# Patient Record
Sex: FEMALE | Race: WHITE | NOT HISPANIC OR LATINO | Employment: FULL TIME | URBAN - NONMETROPOLITAN AREA
[De-identification: names, ages, dates, MRNs, and addresses within clinical notes are randomized per-mention and may not be internally consistent; named-entity substitution may affect disease eponyms.]

---

## 2017-01-03 ENCOUNTER — OFFICE VISIT (OUTPATIENT)
Dept: INTERNAL MEDICINE | Facility: CLINIC | Age: 42
End: 2017-01-03

## 2017-01-03 VITALS
WEIGHT: 243 LBS | HEART RATE: 79 BPM | OXYGEN SATURATION: 98 % | HEIGHT: 62 IN | BODY MASS INDEX: 44.72 KG/M2 | TEMPERATURE: 99.2 F | SYSTOLIC BLOOD PRESSURE: 126 MMHG | DIASTOLIC BLOOD PRESSURE: 90 MMHG

## 2017-01-03 DIAGNOSIS — Z12.39 BREAST CANCER SCREENING: ICD-10-CM

## 2017-01-03 DIAGNOSIS — E03.9 ACQUIRED HYPOTHYROIDISM: ICD-10-CM

## 2017-01-03 DIAGNOSIS — Z79.4 INSULIN DEPENDENT TYPE 2 DIABETES MELLITUS (HCC): Primary | ICD-10-CM

## 2017-01-03 DIAGNOSIS — F32.0 MILD SINGLE CURRENT EPISODE OF MAJOR DEPRESSIVE DISORDER (HCC): ICD-10-CM

## 2017-01-03 DIAGNOSIS — E11.9 INSULIN DEPENDENT TYPE 2 DIABETES MELLITUS (HCC): Primary | ICD-10-CM

## 2017-01-03 PROBLEM — IMO0002 INSULIN DEPENDENT TYPE 2 DIABETES MELLITUS, UNCONTROLLED: Status: ACTIVE | Noted: 2017-01-03

## 2017-01-03 LAB
ALBUMIN SERPL-MCNC: 4.2 G/DL (ref 3.2–4.8)
ALBUMIN/GLOB SERPL: 1.4 G/DL (ref 1.5–2.5)
ALP SERPL-CCNC: 73 U/L (ref 25–100)
ALT SERPL W P-5'-P-CCNC: 38 U/L (ref 7–40)
ANION GAP SERPL CALCULATED.3IONS-SCNC: 7 MMOL/L (ref 3–11)
ARTICHOKE IGE QN: 86 MG/DL (ref 0–130)
AST SERPL-CCNC: 25 U/L (ref 0–33)
BILIRUB SERPL-MCNC: 0.4 MG/DL (ref 0.3–1.2)
BUN BLD-MCNC: 9 MG/DL (ref 9–23)
BUN/CREAT SERPL: 12.9 (ref 7–25)
CALCIUM SPEC-SCNC: 9 MG/DL (ref 8.7–10.4)
CHLORIDE SERPL-SCNC: 100 MMOL/L (ref 99–109)
CHOLEST SERPL-MCNC: 168 MG/DL (ref 0–200)
CO2 SERPL-SCNC: 29 MMOL/L (ref 20–31)
CREAT BLD-MCNC: 0.7 MG/DL (ref 0.6–1.3)
GFR SERPL CREATININE-BSD FRML MDRD: 91 ML/MIN/1.73
GLOBULIN UR ELPH-MCNC: 3 GM/DL
GLUCOSE BLD-MCNC: 217 MG/DL (ref 70–100)
HBA1C MFR BLD: 10.6 %
HDLC SERPL-MCNC: 58 MG/DL (ref 40–60)
POC CREATININE URINE: 300
POC MICROALBUMIN URINE: 150
POTASSIUM BLD-SCNC: 4.2 MMOL/L (ref 3.5–5.5)
PROT SERPL-MCNC: 7.2 G/DL (ref 5.7–8.2)
SODIUM BLD-SCNC: 136 MMOL/L (ref 132–146)
TRIGL SERPL-MCNC: 153 MG/DL (ref 0–150)
TSH SERPL DL<=0.05 MIU/L-ACNC: 15.62 MIU/ML (ref 0.35–5.35)

## 2017-01-03 PROCEDURE — 82044 UR ALBUMIN SEMIQUANTITATIVE: CPT | Performed by: FAMILY MEDICINE

## 2017-01-03 PROCEDURE — 84443 ASSAY THYROID STIM HORMONE: CPT | Performed by: FAMILY MEDICINE

## 2017-01-03 PROCEDURE — 80053 COMPREHEN METABOLIC PANEL: CPT | Performed by: FAMILY MEDICINE

## 2017-01-03 PROCEDURE — 99204 OFFICE O/P NEW MOD 45 MIN: CPT | Performed by: FAMILY MEDICINE

## 2017-01-03 PROCEDURE — 80061 LIPID PANEL: CPT | Performed by: FAMILY MEDICINE

## 2017-01-03 PROCEDURE — 83036 HEMOGLOBIN GLYCOSYLATED A1C: CPT | Performed by: FAMILY MEDICINE

## 2017-01-03 RX ORDER — DICYCLOMINE HYDROCHLORIDE 10 MG/1
10 CAPSULE ORAL AS NEEDED
COMMUNITY
End: 2019-02-08

## 2017-01-03 RX ORDER — LEVOTHYROXINE SODIUM 0.03 MG/1
25 TABLET ORAL DAILY
Qty: 30 TABLET | Refills: 5 | Status: SHIPPED | OUTPATIENT
Start: 2017-01-03 | End: 2017-10-27 | Stop reason: SDUPTHER

## 2017-01-03 RX ORDER — METFORMIN HYDROCHLORIDE EXTENDED-RELEASE TABLETS 1000 MG/1
TABLET, FILM COATED, EXTENDED RELEASE ORAL
Refills: 2 | COMMUNITY
Start: 2016-10-13 | End: 2017-01-03

## 2017-01-03 RX ORDER — INSULIN GLARGINE 100 [IU]/ML
INJECTION, SOLUTION SUBCUTANEOUS
COMMUNITY
Start: 2016-12-29 | End: 2017-01-03 | Stop reason: SDUPTHER

## 2017-01-03 RX ORDER — METFORMIN HYDROCHLORIDE EXTENDED-RELEASE TABLETS 1000 MG/1
1000 TABLET, FILM COATED, EXTENDED RELEASE ORAL 2 TIMES DAILY WITH MEALS
Qty: 60 TABLET | Refills: 5 | Status: SHIPPED | OUTPATIENT
Start: 2017-01-03 | End: 2017-12-07 | Stop reason: SINTOL

## 2017-01-03 RX ORDER — HYDROXYZINE HYDROCHLORIDE 10 MG/1
10 TABLET, FILM COATED ORAL 3 TIMES DAILY PRN
COMMUNITY
End: 2019-02-08 | Stop reason: SDUPTHER

## 2017-01-03 RX ORDER — LANSOPRAZOLE 15 MG/1
15 CAPSULE, DELAYED RELEASE ORAL DAILY
COMMUNITY
End: 2022-01-25

## 2017-01-03 RX ORDER — MELATONIN
1000 DAILY
COMMUNITY

## 2017-01-03 RX ORDER — CITALOPRAM 20 MG/1
20 TABLET ORAL DAILY
COMMUNITY
End: 2017-01-17 | Stop reason: SDUPTHER

## 2017-01-03 RX ORDER — LEVOTHYROXINE SODIUM 0.2 MG/1
TABLET ORAL
COMMUNITY
Start: 2016-12-11 | End: 2017-01-25 | Stop reason: SDUPTHER

## 2017-01-03 NOTE — MR AVS SNAPSHOT
Rox Garner   1/3/2017 9:15 AM   Office Visit    Provider:  Fior Ramirez MD   Department:  [facility] PRIMARY CARE   Dept Phone:  [phone]                Your Full Care Plan              Today's Medication Changes          These changes are accurate as of: 1/3/17 10:08 AM.  If you have any questions, ask your nurse or doctor.               New Medication(s)Ordered:     linagliptin 5 MG tablet tablet   Commonly known as:  TRADJENTA   Take 1 tablet by mouth Daily.   Started by:  Fior Ramirez MD       metFORMIN 1000 MG (OSM) 24 hr tablet   Commonly known as:  FORTAMET   Take 1 tablet by mouth 2 (Two) Times a Day With Meals.   Started by:  Fior Ramirez MD         Medication(s)that have changed:     Insulin Glargine 100 UNIT/ML injection pen   Commonly known as:  LANTUS SOLOSTAR   Inject 24 Units under the skin Every Night.   What changed:    - how much to take  - how to take this  - when to take this   Changed by:  Fior Ramirez MD            Where to Get Your Medications      These medications were sent to [facility] [address] [place] [address] - [address] [place] [address] - [phone]  - [phone] [address] [address]     Phone:  [phone]     Insulin Glargine 100 UNIT/ML injection pen    linagliptin 5 MG tablet tablet    metFORMIN 1000 MG (OSM) 24 hr tablet                  Your Updated Medication List          This list is accurate as of: 1/3/17 10:08 AM.  Always use your most recent med list.                cholecalciferol 1000 UNITS tablet   Commonly known as:  VITAMIN D3       citalopram 20 MG tablet   Commonly known as:  CeleXA       dicyclomine 10 MG capsule   Commonly known as:  BENTYL       hydrOXYzine 10 MG tablet   Commonly known as:  ATARAX       Insulin Glargine 100 UNIT/ML injection pen   Commonly known as:  LANTUS SOLOSTAR   Inject 24 Units under the skin Every Night.       lansoprazole 15 MG  capsule   Commonly known as:  PREVACID       levothyroxine 200 MCG tablet   Commonly known as:  SYNTHROID, LEVOTHROID       linagliptin 5 MG tablet tablet   Commonly known as:  TRADJENTA   Take 1 tablet by mouth Daily.       metFORMIN 1000 MG (OSM) 24 hr tablet   Commonly known as:  FORTAMET   Take 1 tablet by mouth 2 (Two) Times a Day With Meals.               We Performed the Following     POC Glycosylated Hemoglobin (Hb A1C)     POC Microalbumin       You Were Diagnosed With        Codes Comments    Insulin dependent type 2 diabetes mellitus    -  Primary ICD-10-CM: E11.9, Z79.4  ICD-9-CM: 250.00, V58.67     Acquired hypothyroidism     ICD-10-CM: E03.9  ICD-9-CM: 244.9     Breast cancer screening     ICD-10-CM: Z12.39  ICD-9-CM: V76.10     Mild single current episode of major depressive disorder     ICD-10-CM: F32.0  ICD-9-CM: 296.21       Instructions     None    Patient Instructions History      [facility] Signup     [facility] momondo allows you to send messages to your doctor, view your test results, renew your prescriptions, schedule appointments, and more. To sign up, go to [employer] and click on the Sign Up Now link in the New User? box. Enter your momondo Activation Code exactly as it appears below along with the last four digits of your Social Security Number and your Date of Birth () to complete the sign-up process. If you do not sign up before the expiration date, you must request a new code.    momondo Activation Code: [email or web address]  Expires: 2017 10:08 AM    If you have questions, you can email [email or web address] or call [phone] to talk to our momondo staff. Remember, momondo is NOT to be used for urgent needs. For medical emergencies, dial 911.               Other Info from Your Visit           Allergies     Penicillins      Sulfa Antibiotics        Reason for Visit     Establish Care ESTABLISH CARE WITH DR KHAN    Diabetes     Med Refill           Vital  "Signs     Blood Pressure Pulse Temperature Height Weight Oxygen Saturation    126/90 79 99.2 °F (37.3 °C) 62\" (157.5 cm) 243 lb (110 kg) 98%    Body Mass Index Smoking Status                44.45 kg/m2 Never Smoker          Problems and Diagnoses Noted     Acquired underactive thyroid    Insulin dependent type 2 diabetes mellitus, uncontrolled    Mild single current episode of major depressive disorder    Insulin dependent type 2 diabetes mellitus    -  Primary    Breast cancer screening          Results     POC Glycosylated Hemoglobin (Hb A1C)      Component Value Standard Range & Units    Hemoglobin A1C 10.6 %                  "

## 2017-01-03 NOTE — PROGRESS NOTES
"Subjective   Rox Garner is a 41 y.o. female.     Diabetes   She presents for her initial diabetic visit. She has type 2 diabetes mellitus. Her disease course has been stable. Pertinent negatives for diabetes include no chest pain. There are no hypoglycemic complications. There are no diabetic complications. Risk factors for coronary artery disease include obesity and sedentary lifestyle. Current diabetic treatment includes insulin injections and oral agent (monotherapy). Blood glucose monitoring compliance is poor. An ACE inhibitor/angiotensin II receptor blocker is not being taken. She does not see a podiatrist.Eye exam is not current (Patient is awaiting her vision insurance card to schedule.).   Hypothyroidism   This is a chronic problem. The current episode started more than 1 year ago. The problem occurs constantly. The problem has been unchanged. Pertinent negatives include no chest pain. Treatments tried: She is taking levothyroxine.   Depression   Visit Type: initial  Onset of symptoms: at an unknown time  Progression since onset: controlled  Patient presents with the following symptoms: anhedonia (Patient has had a lot of stress in life over last few months and has just \"let myself go.\").  Aggravated by: family issues  Risk factors: major life event  Patient has a history of: depression  Treatment tried: SSRI             PHQ-9 Depression Screening 1/3/2017   Little interest or pleasure in doing things 0   Feeling down, depressed, or hopeless 1   Trouble falling or staying asleep, or sleeping too much 0   Feeling tired or having little energy 0   Poor appetite or overeating 0   Feeling bad about yourself - or that you are a failure or have let yourself or your family down 0   Trouble concentrating on things, such as reading the newspaper or watching television 0   Moving or speaking so slowly that other people could have noticed. Or the opposite - being so fidgety or restless that you have been " moving around a lot more than usual 0   Thoughts that you would be better off dead, or of hurting yourself in some way 0   PHQ-9 Total Score 1   If you checked off any problems, how difficult have these problems made it for you to do your work, take care of things at home, or get along with other people? Not difficult at all       The following portions of the patient's history were reviewed and updated as appropriate: allergies, current medications, past family history, past medical history, past social history, past surgical history and problem list.    Review of Systems   Cardiovascular: Negative for chest pain.        Leg cramps   Genitourinary: Menstrual problem: last menses 12/12/16.   All other systems reviewed and are negative.      Objective   Physical Exam   Constitutional: She is oriented to person, place, and time. Vital signs are normal. She appears well-developed and well-nourished. She is active. She does not appear ill.   Appears stated age. Well groomed. Obese     HENT:   Head: Normocephalic and atraumatic. Hair is normal.   Right Ear: Hearing normal.   Left Ear: Hearing normal.   Nose: Nose normal.   Eyes: EOM and lids are normal. Pupils are equal, round, and reactive to light.   Wearing glasses     Cardiovascular: Normal rate, regular rhythm and normal heart sounds.    Pulmonary/Chest: Effort normal and breath sounds normal.   Neurological: She is alert and oriented to person, place, and time. No cranial nerve deficit. Gait normal.   Skin: She is not diaphoretic. No cyanosis. Nails show no clubbing.   Psychiatric: She has a normal mood and affect. Her behavior is normal. Judgment and thought content normal.   Nursing note and vitals reviewed.        Lab Results   Component Value Date    HGBA1C 10.6 01/03/2017       Assessment/Plan   Rox was seen today for establish care, diabetes and med refill.    Diagnoses and all orders for this visit:    Insulin dependent type 2 diabetes mellitus  -      POC Glycosylated Hemoglobin (Hb A1C)  -     metFORMIN (FORTAMET) 1000 MG (OSM) 24 hr tablet; Take 1 tablet by mouth 2 (Two) Times a Day With Meals.  -     Comprehensive Metabolic Panel; Future  -     Lipid Panel; Future  -     POC Microalbumin  -     linagliptin (TRADJENTA) 5 MG tablet tablet; Take 1 tablet by mouth Daily.  -     Insulin Glargine (LANTUS SOLOSTAR) 100 UNIT/ML injection pen; Inject 24 Units under the skin Every Night.  -     Comprehensive Metabolic Panel  -     Lipid Panel    Acquired hypothyroidism  -     TSH; Future  -     TSH    Breast cancer screening  -     Mammo Screening Digital Tomosynthesis Bilateral With CAD; Future    Mild single current episode of major depressive disorder      Patient to work on eating habits, improved lifestyle, using medicine as directed. Added Tradjenta. Encouraged checking sugars.

## 2017-01-17 ENCOUNTER — TELEPHONE (OUTPATIENT)
Dept: INTERNAL MEDICINE | Facility: CLINIC | Age: 42
End: 2017-01-17

## 2017-01-17 RX ORDER — CITALOPRAM 20 MG/1
20 TABLET ORAL DAILY
Qty: 30 TABLET | Refills: 0 | Status: SHIPPED | OUTPATIENT
Start: 2017-01-17 | End: 2017-03-17 | Stop reason: SDUPTHER

## 2017-01-17 NOTE — TELEPHONE ENCOUNTER
----- Message from Rox Garner sent at 1/17/2017  7:54 AM EST -----  Regarding: Prescription Question  Contact: [phone]  I need a refill on my Celexa

## 2017-01-25 RX ORDER — LEVOTHYROXINE SODIUM 0.2 MG/1
200 TABLET ORAL DAILY
Qty: 30 TABLET | Refills: 3 | Status: SHIPPED | OUTPATIENT
Start: 2017-01-25 | End: 2017-11-17 | Stop reason: SDUPTHER

## 2017-01-31 ENCOUNTER — PATIENT OUTREACH (OUTPATIENT)
Dept: INTERNAL MEDICINE | Facility: CLINIC | Age: 42
End: 2017-01-31

## 2017-02-01 ENCOUNTER — OFFICE VISIT (OUTPATIENT)
Dept: INTERNAL MEDICINE | Facility: CLINIC | Age: 42
End: 2017-02-01

## 2017-02-01 VITALS
WEIGHT: 243.6 LBS | BODY MASS INDEX: 44.83 KG/M2 | HEART RATE: 57 BPM | OXYGEN SATURATION: 95 % | SYSTOLIC BLOOD PRESSURE: 138 MMHG | TEMPERATURE: 97.5 F | DIASTOLIC BLOOD PRESSURE: 88 MMHG | HEIGHT: 62 IN

## 2017-02-01 DIAGNOSIS — Z79.4 INSULIN DEPENDENT TYPE 2 DIABETES MELLITUS (HCC): ICD-10-CM

## 2017-02-01 DIAGNOSIS — E11.9 INSULIN DEPENDENT TYPE 2 DIABETES MELLITUS (HCC): ICD-10-CM

## 2017-02-01 DIAGNOSIS — IMO0002 INSULIN DEPENDENT TYPE 2 DIABETES MELLITUS, UNCONTROLLED: Primary | ICD-10-CM

## 2017-02-01 LAB — GLUCOSE BLDC GLUCOMTR-MCNC: 319 MG/DL (ref 70–130)

## 2017-02-01 PROCEDURE — 82962 GLUCOSE BLOOD TEST: CPT | Performed by: FAMILY MEDICINE

## 2017-02-01 PROCEDURE — 99213 OFFICE O/P EST LOW 20 MIN: CPT | Performed by: FAMILY MEDICINE

## 2017-02-01 NOTE — PROGRESS NOTES
Subjective   Rox Garner is a 41 y.o. female.     Diabetes   She presents for her follow-up diabetic visit. She has type 2 diabetes mellitus. Her disease course has been improving. There are no diabetic associated symptoms. Pertinent negatives for diabetes include no chest pain. There are no hypoglycemic complications. There are no diabetic complications. Pertinent negatives for diabetic complications include no retinopathy (had eye exam last week). Risk factors for coronary artery disease include diabetes mellitus, obesity and stress. Current diabetic treatment includes insulin injections and oral agent (dual therapy). She is compliant with treatment most of the time. Her weight is stable. Diabetic current diet: working to decrease fast foods, eat more oatmeal and healthy foods. When asked about meal planning, she reported none. She has not had a previous visit with a dietitian. Blood glucose monitoring compliance is inadequate. An ACE inhibitor/angiotensin II receptor blocker is not being taken. She does not see a podiatrist.Eye exam is current.    Checked fasting sugar once since last visit and was 282. Battery  on glucometer.    The following portions of the patient's history were reviewed and updated as appropriate: allergies, current medications, past family history, past medical history, past social history, past surgical history and problem list.    Review of Systems   Respiratory: Negative for shortness of breath.    Cardiovascular: Negative for chest pain.   Gastrointestinal: Negative for abdominal pain.       Objective   Physical Exam   Constitutional: She is oriented to person, place, and time. Vital signs are normal. She appears well-developed and well-nourished. She is active. She does not appear ill.   Appears stated age. Well groomed. Extremely Obese     HENT:   Head: Normocephalic and atraumatic.   Right Ear: Hearing normal.   Left Ear: Hearing normal.   Nose: Nose normal.   Eyes:  EOM and lids are normal. Pupils are equal, round, and reactive to light.   Wearing glasses     Cardiovascular: Normal rate, regular rhythm and normal heart sounds.    Pulmonary/Chest: Effort normal and breath sounds normal.   Neurological: She is alert and oriented to person, place, and time. No cranial nerve deficit. Gait normal.   Skin: She is not diaphoretic. No cyanosis. Nails show no clubbing.   Psychiatric: She has a normal mood and affect. Her behavior is normal. Judgment and thought content normal.   Nursing note and vitals reviewed.    Fasting glucose here in office was 319.    Assessment/Plan   Rox was seen today for diabetes.    Diagnoses and all orders for this visit:    Insulin dependent type 2 diabetes mellitus, uncontrolled  -     POC Glucose Fingerstick    Insulin dependent type 2 diabetes mellitus  -     Insulin Glargine (LANTUS SOLOSTAR) 100 UNIT/ML injection pen; Inject 30 Units under the skin Every Night.    Check fasting sugars and slowly increase Lantus dose. Will see back in 2-3 weeks with glucose log.

## 2017-03-17 RX ORDER — CITALOPRAM 20 MG/1
TABLET ORAL
Qty: 30 TABLET | Refills: 0 | Status: SHIPPED | OUTPATIENT
Start: 2017-03-17 | End: 2017-05-17 | Stop reason: SDUPTHER

## 2017-04-27 ENCOUNTER — OFFICE VISIT (OUTPATIENT)
Dept: INTERNAL MEDICINE | Facility: CLINIC | Age: 42
End: 2017-04-27

## 2017-04-27 VITALS
SYSTOLIC BLOOD PRESSURE: 114 MMHG | BODY MASS INDEX: 44.68 KG/M2 | HEART RATE: 68 BPM | DIASTOLIC BLOOD PRESSURE: 80 MMHG | TEMPERATURE: 98.1 F | HEIGHT: 62 IN | OXYGEN SATURATION: 99 % | WEIGHT: 242.8 LBS

## 2017-04-27 DIAGNOSIS — IMO0002 INSULIN DEPENDENT TYPE 2 DIABETES MELLITUS, UNCONTROLLED: Primary | ICD-10-CM

## 2017-04-27 DIAGNOSIS — E11.9 INSULIN DEPENDENT TYPE 2 DIABETES MELLITUS (HCC): ICD-10-CM

## 2017-04-27 DIAGNOSIS — Z79.4 INSULIN DEPENDENT TYPE 2 DIABETES MELLITUS (HCC): ICD-10-CM

## 2017-04-27 LAB
GLUCOSE BLDC GLUCOMTR-MCNC: 346 MG/DL (ref 70–130)
HBA1C MFR BLD: 11.4 %

## 2017-04-27 PROCEDURE — 82962 GLUCOSE BLOOD TEST: CPT | Performed by: FAMILY MEDICINE

## 2017-04-27 PROCEDURE — 99213 OFFICE O/P EST LOW 20 MIN: CPT | Performed by: FAMILY MEDICINE

## 2017-04-27 PROCEDURE — 83036 HEMOGLOBIN GLYCOSYLATED A1C: CPT | Performed by: FAMILY MEDICINE

## 2017-04-29 NOTE — PROGRESS NOTES
Subjective   Rox Garner is a 41 y.o. female.     Diabetes   She presents for her follow-up diabetic visit. She has type 2 diabetes mellitus. Her disease course has been improving. Associated symptoms include fatigue. Pertinent negatives for diabetes include no chest pain, no foot ulcerations and no visual change. There are no hypoglycemic complications. Symptoms are stable. There are no diabetic complications. Pertinent negatives for diabetic complications include no retinopathy (had eye exam January). Risk factors for coronary artery disease include diabetes mellitus, obesity and stress. Current diabetic treatment includes insulin injections and oral agent (dual therapy). She is compliant with treatment most of the time. Her weight is stable. She is following a generally unhealthy (working to decrease fast foods, eat more oatmeal and healthy foods) diet. When asked about meal planning, she reported none. She has not had a previous visit with a dietitian. Blood glucose monitoring compliance is inadequate. Home blood sugar record trend: Has been consistently > 200 in AM. Was 340s this morning but did not take lantus last night. An ACE inhibitor/angiotensin II receptor blocker is not being taken. She does not see a podiatrist.Eye exam is current.        The following portions of the patient's history were reviewed and updated as appropriate: allergies, current medications, past family history, past medical history, past social history, past surgical history and problem list.    Review of Systems   Constitutional: Positive for fatigue.   Respiratory: Negative for shortness of breath.    Cardiovascular: Negative for chest pain.   Gastrointestinal: Negative for abdominal pain.   Genitourinary: Positive for menstrual problem (skips months, negative pregnancy tests).       Vitals:    04/27/17 0913   BP: 114/80   Pulse: 68   Temp: 98.1 °F (36.7 °C)   SpO2: 99%       Objective   Physical Exam   Constitutional:  She is oriented to person, place, and time. Vital signs are normal. She appears well-developed and well-nourished. She is active. She does not have a sickly appearance. She does not appear ill.   Appears stated age. Well groomed. Extremely Obese     HENT:   Head: Normocephalic and atraumatic. Hair is normal.   Right Ear: Hearing normal.   Left Ear: Hearing normal.   Nose: Nose normal.   Eyes: EOM and lids are normal. Pupils are equal, round, and reactive to light.   Wearing glasses     Cardiovascular: Normal rate, regular rhythm and normal heart sounds.    Pulmonary/Chest: Effort normal and breath sounds normal.   Musculoskeletal: She exhibits no deformity.   Neurological: She is alert and oriented to person, place, and time. No cranial nerve deficit. Gait normal.   Skin: She is not diaphoretic. No cyanosis. Nails show no clubbing.   Psychiatric: She has a normal mood and affect. Her behavior is normal. Judgment and thought content normal.   Nursing note and vitals reviewed.    Lab Results   Component Value Date    HGBA1C 11.4 04/27/2017       Assessment/Plan   Rox was seen today for diabetes.    Diagnoses and all orders for this visit:    Insulin dependent type 2 diabetes mellitus, uncontrolled  -     POC Glycosylated Hemoglobin (Hb A1C)  -     Liraglutide 18 MG/3ML solution pen-injector; 0.6 mg QDAY for 1 week; then 1.2 mg once daily; may increase further to 1.8 mg once daily  -     Insulin Glargine (TOUJEO SOLOSTAR) 300 UNIT/ML solution pen-injector; Inject 30 Units under the skin Daily.  -     Insulin Glargine (TOUJEO SOLOSTAR) 300 UNIT/ML solution pen-injector; Inject 30 Units under the skin every night at bedtime for 30 days.  -     Liraglutide 18 MG/3ML solution pen-injector; Inject 1.8 mg under the skin Daily.  -     linagliptin (TRADJENTA) 5 MG tablet tablet; Take 1 tablet by mouth Daily.    Insulin dependent type 2 diabetes mellitus  -     POCT Glucose    Encouraged healthy diet, exercise. Keep log of  sugars and bring to visit.   Has not yet had mammogram, encouraged her to schedule it.  Plan to do pap smear next visit.           Fior Ramirez MD

## 2017-05-19 RX ORDER — CITALOPRAM 20 MG/1
TABLET ORAL
Qty: 30 TABLET | Refills: 5 | Status: SHIPPED | OUTPATIENT
Start: 2017-05-19 | End: 2018-04-18 | Stop reason: SDUPTHER

## 2017-10-27 ENCOUNTER — TELEPHONE (OUTPATIENT)
Dept: INTERNAL MEDICINE | Facility: CLINIC | Age: 42
End: 2017-10-27

## 2017-10-27 RX ORDER — LEVOTHYROXINE SODIUM 0.03 MG/1
25 TABLET ORAL DAILY
Qty: 30 TABLET | Refills: 5 | Status: SHIPPED | OUTPATIENT
Start: 2017-10-27 | End: 2018-10-05

## 2017-10-27 NOTE — TELEPHONE ENCOUNTER
----- Message from Rox Garner sent at 10/27/2017 10:41 AM EDT -----  Regarding: Prescription Question  Contact: [phone]  I need refill on levothyroxine 200

## 2017-11-17 RX ORDER — LEVOTHYROXINE SODIUM 0.2 MG/1
200 TABLET ORAL DAILY
Qty: 30 TABLET | Refills: 0 | Status: SHIPPED | OUTPATIENT
Start: 2017-11-17 | End: 2017-12-07 | Stop reason: SDUPTHER

## 2017-12-07 ENCOUNTER — OFFICE VISIT (OUTPATIENT)
Dept: INTERNAL MEDICINE | Facility: CLINIC | Age: 42
End: 2017-12-07

## 2017-12-07 VITALS
TEMPERATURE: 98.1 F | RESPIRATION RATE: 12 BRPM | DIASTOLIC BLOOD PRESSURE: 80 MMHG | WEIGHT: 243 LBS | SYSTOLIC BLOOD PRESSURE: 122 MMHG | HEIGHT: 62 IN | HEART RATE: 83 BPM | OXYGEN SATURATION: 97 % | BODY MASS INDEX: 44.72 KG/M2

## 2017-12-07 DIAGNOSIS — E03.9 ACQUIRED HYPOTHYROIDISM: ICD-10-CM

## 2017-12-07 DIAGNOSIS — E66.01 MORBID OBESITY (HCC): ICD-10-CM

## 2017-12-07 DIAGNOSIS — R53.83 FATIGUE, UNSPECIFIED TYPE: ICD-10-CM

## 2017-12-07 DIAGNOSIS — IMO0002 INSULIN DEPENDENT TYPE 2 DIABETES MELLITUS, UNCONTROLLED: Primary | ICD-10-CM

## 2017-12-07 DIAGNOSIS — Z28.21 INFLUENZA VACCINATION DECLINED: ICD-10-CM

## 2017-12-07 DIAGNOSIS — E55.9 VITAMIN D DEFICIENCY: ICD-10-CM

## 2017-12-07 LAB
25(OH)D3+25(OH)D2 SERPL-MCNC: 19.9 NG/ML
ALBUMIN SERPL-MCNC: 4 G/DL (ref 3.5–5)
ALBUMIN/GLOB SERPL: 1.3 G/DL (ref 1–2)
ALP SERPL-CCNC: 81 U/L (ref 38–126)
ALT SERPL-CCNC: 59 U/L (ref 13–69)
AST SERPL-CCNC: 43 U/L (ref 15–46)
BASOPHILS # BLD AUTO: 0.08 10*3/MM3 (ref 0–0.2)
BASOPHILS NFR BLD AUTO: 1.3 % (ref 0–2.5)
BILIRUB SERPL-MCNC: 0.4 MG/DL (ref 0.2–1.3)
BUN SERPL-MCNC: 10 MG/DL (ref 7–20)
BUN/CREAT SERPL: 16.7 (ref 7.1–23.5)
CALCIUM SERPL-MCNC: 9.4 MG/DL (ref 8.4–10.2)
CHLORIDE SERPL-SCNC: 102 MMOL/L (ref 98–107)
CHOLEST SERPL-MCNC: 142 MG/DL (ref 0–199)
CO2 SERPL-SCNC: 24 MMOL/L (ref 26–30)
CREAT SERPL-MCNC: 0.6 MG/DL (ref 0.6–1.3)
EOSINOPHIL # BLD AUTO: 0.34 10*3/MM3 (ref 0–0.7)
EOSINOPHIL NFR BLD AUTO: 5.6 % (ref 0–7)
ERYTHROCYTE [DISTWIDTH] IN BLOOD BY AUTOMATED COUNT: 13.8 % (ref 11.5–14.5)
FOLATE SERPL-MCNC: 11.7 NG/ML
GFR SERPLBLD CREATININE-BSD FMLA CKD-EPI: 109 ML/MIN/1.73
GFR SERPLBLD CREATININE-BSD FMLA CKD-EPI: 132 ML/MIN/1.73
GLOBULIN SER CALC-MCNC: 3.2 GM/DL
GLUCOSE BLDC GLUCOMTR-MCNC: 267 MG/DL (ref 70–130)
GLUCOSE SERPL-MCNC: 250 MG/DL (ref 74–98)
HBA1C MFR BLD: 11 %
HCT VFR BLD AUTO: 43.3 % (ref 37–47)
HDLC SERPL-MCNC: 47 MG/DL (ref 40–60)
HGB BLD-MCNC: 13.7 G/DL (ref 12–16)
IMM GRANULOCYTES # BLD: 0.01 10*3/MM3 (ref 0–0.06)
IMM GRANULOCYTES NFR BLD: 0.2 % (ref 0–0.6)
LDLC SERPL CALC-MCNC: 75 MG/DL (ref 0–99)
LYMPHOCYTES # BLD AUTO: 1.94 10*3/MM3 (ref 0.6–3.4)
LYMPHOCYTES NFR BLD AUTO: 32 % (ref 10–50)
MCH RBC QN AUTO: 26.9 PG (ref 27–31)
MCHC RBC AUTO-ENTMCNC: 31.6 G/DL (ref 30–37)
MCV RBC AUTO: 84.9 FL (ref 81–99)
MONOCYTES # BLD AUTO: 0.43 10*3/MM3 (ref 0–0.9)
MONOCYTES NFR BLD AUTO: 7.1 % (ref 0–12)
NEUTROPHILS # BLD AUTO: 3.26 10*3/MM3 (ref 2–6.9)
NEUTROPHILS NFR BLD AUTO: 53.8 % (ref 37–80)
NRBC BLD AUTO-RTO: 0 /100 WBC (ref 0–0)
PLATELET # BLD AUTO: 103 10*3/MM3 (ref 130–400)
POTASSIUM SERPL-SCNC: 4.8 MMOL/L (ref 3.5–5.1)
PROT SERPL-MCNC: 7.2 G/DL (ref 6.3–8.2)
RBC # BLD AUTO: 5.1 10*6/MM3 (ref 4.2–5.4)
SODIUM SERPL-SCNC: 137 MMOL/L (ref 137–145)
TRIGL SERPL-MCNC: 99 MG/DL
TSH SERPL DL<=0.005 MIU/L-ACNC: 7.25 MIU/ML (ref 0.47–4.68)
VIT B12 SERPL-MCNC: 534 PG/ML (ref 239–931)
VLDLC SERPL CALC-MCNC: 19.8 MG/DL
WBC # BLD AUTO: 6.06 10*3/MM3 (ref 4.8–10.8)

## 2017-12-07 PROCEDURE — 99214 OFFICE O/P EST MOD 30 MIN: CPT | Performed by: FAMILY MEDICINE

## 2017-12-07 PROCEDURE — 82962 GLUCOSE BLOOD TEST: CPT | Performed by: FAMILY MEDICINE

## 2017-12-07 PROCEDURE — 83036 HEMOGLOBIN GLYCOSYLATED A1C: CPT | Performed by: FAMILY MEDICINE

## 2017-12-07 RX ORDER — LEVOTHYROXINE SODIUM 0.2 MG/1
200 TABLET ORAL DAILY
Qty: 30 TABLET | Refills: 3 | Status: SHIPPED | OUTPATIENT
Start: 2017-12-07 | End: 2018-10-05 | Stop reason: SDUPTHER

## 2017-12-07 NOTE — PROGRESS NOTES
Subjective    Rox Garner is a 42 y.o. female here for:  Chief Complaint   Patient presents with   • Diabetes   • Hypothyroidism     HPI Comments: Extended release metformin caused excessive GI upset so she had to stop it.  Tried Victoza though was throwing up the next day.  Taking insulin at night and Tradjenta as prescribed. She is trying to cut back on food.     Diabetes   She presents for her follow-up diabetic visit. She has type 2 diabetes mellitus. No MedicAlert identification noted. The initial diagnosis of diabetes was made 11 years ago. Associated symptoms include fatigue and polydipsia. Pertinent negatives for diabetes include no chest pain. There are no hypoglycemic complications. Symptoms are worsening. Diabetic complications include PVD. Pertinent negatives for diabetic complications include no CVA or heart disease. Risk factors for coronary artery disease include obesity and sedentary lifestyle. Current diabetic treatment includes insulin injections and oral agent (monotherapy). She is compliant with treatment most of the time. She is currently taking insulin at bedtime. Insulin injections are given by patient. Rotation sites for injection include the abdominal wall. Her weight is stable. She is following a generally unhealthy diet. When asked about meal planning, she reported none. She has not had a previous visit with a dietitian. She never participates in exercise. Blood glucose monitoring compliance is inadequate. There is no change in her home blood glucose trend. (Most sugars are greater than 250, very common to be over 260 in the morning fasting.) She does not see a podiatrist.Eye exam is current.   Hypothyroidism   This is a chronic problem. The current episode started more than 1 year ago. The problem occurs daily. Associated symptoms include fatigue. Pertinent negatives include no chest pain. Treatments tried: levothyroxine. The treatment provided significant relief.        The  following portions of the patient's history were reviewed and updated as appropriate: allergies, current medications, past family history, past medical history, past social history, past surgical history and problem list.    Review of Systems   Constitutional: Positive for fatigue.   Respiratory: Negative for shortness of breath.    Cardiovascular: Negative for chest pain.   Endocrine: Positive for polydipsia.       Vitals:    12/07/17 0914   BP: 122/80   Pulse: 83   Resp: 12   Temp: 98.1 °F (36.7 °C)   SpO2: 97%         Objective   Physical Exam   Constitutional: She is oriented to person, place, and time. Vital signs are normal. She appears well-developed and well-nourished. She is active. She does not have a sickly appearance. She does not appear ill.   Appears stated age. Well groomed. Extremely Obese   HENT:   Head: Normocephalic and atraumatic. Hair is normal.   Right Ear: Hearing normal.   Left Ear: Hearing normal.   Nose: Nose normal.   Eyes: EOM and lids are normal. Pupils are equal, round, and reactive to light. No scleral icterus.   Wearing glasses   Neck: Phonation normal.   Cardiovascular: Normal rate, regular rhythm and normal heart sounds.    Pulmonary/Chest: Effort normal and breath sounds normal.   Musculoskeletal: She exhibits no deformity.   Neurological: She is alert and oriented to person, place, and time. No cranial nerve deficit. Gait normal.   Skin: She is not diaphoretic. No cyanosis. Nails show no clubbing.   Psychiatric: She has a normal mood and affect. Her behavior is normal. Judgment and thought content normal.   Nursing note and vitals reviewed.      Lab Results   Component Value Date    HGBA1C 11.4 04/27/2017       Lab Results   Component Value Date    TSH 15.618 (H) 01/03/2017     Office Visit on 12/07/2017   Component Date Value Ref Range Status   • Hemoglobin A1C 12/07/2017 11.0  % Final   • Glucose 12/07/2017 267* 70 - 130 mg/dL Final           Rox was seen today for diabetes  and hypothyroidism.    Diagnoses and all orders for this visit:    Insulin dependent type 2 diabetes mellitus, uncontrolled  -     POC Glycosylated Hemoglobin (Hb A1C)  -     Dulaglutide (TRULICITY) 0.75 MG/0.5ML solution pen-injector; Inject 0.75 mg under the skin 1 (One) Time Per Week.  -     Dulaglutide (TRULICITY) 0.75 MG/0.5ML solution pen-injector; Inject 0.75 mg under the skin 1 (One) Time Per Week.  -     Lipid Panel  -     CBC & Differential  -     Comprehensive Metabolic Panel  -     POC Glucose Fingerstick  -     Insulin Glargine (TOUJEO SOLOSTAR) 300 UNIT/ML solution pen-injector; Inject 70 Units under the skin Daily for 30 days.    Acquired hypothyroidism  -     levothyroxine (SYNTHROID, LEVOTHROID) 200 MCG tablet; Take 1 tablet by mouth Daily.  -     TSH  -     CBC & Differential  -     Comprehensive Metabolic Panel    Morbid obesity  -     Vitamin B12 & Folate    Fatigue, unspecified type  -     Vitamin B12 & Folate    Vitamin D deficiency  -     Vitamin D 25 Hydroxy    Influenza vaccination declined        · Encouraged improved dietary habits, exercise, weight loss.  Past labs showed decreased vitamin D level sore rechecking that today.  · Sample of Trulicity provided as well as a co-pay card.  Prescription sent as well.  · I encouraged her to get mammogram done as well as returning for Pap smear.      Fior Ramirez MD

## 2017-12-10 RX ORDER — ERGOCALCIFEROL 1.25 MG/1
50000 CAPSULE ORAL WEEKLY
Qty: 6 CAPSULE | Refills: 0 | Status: SHIPPED | OUTPATIENT
Start: 2017-12-10 | End: 2018-10-05

## 2018-03-29 ENCOUNTER — TRANSCRIBE ORDERS (OUTPATIENT)
Dept: ADMINISTRATIVE | Facility: HOSPITAL | Age: 43
End: 2018-03-29

## 2018-03-29 DIAGNOSIS — Z12.31 VISIT FOR SCREENING MAMMOGRAM: Primary | ICD-10-CM

## 2018-04-18 RX ORDER — CITALOPRAM 20 MG/1
TABLET ORAL
Qty: 30 TABLET | Refills: 5 | Status: SHIPPED | OUTPATIENT
Start: 2018-04-18 | End: 2018-10-05 | Stop reason: SDUPTHER

## 2018-06-13 DIAGNOSIS — IMO0002 INSULIN DEPENDENT TYPE 2 DIABETES MELLITUS, UNCONTROLLED: ICD-10-CM

## 2018-06-13 RX ORDER — LINAGLIPTIN 5 MG/1
TABLET, FILM COATED ORAL
Qty: 30 TABLET | Refills: 5 | Status: SHIPPED | OUTPATIENT
Start: 2018-06-13 | End: 2018-10-05

## 2018-10-05 ENCOUNTER — OFFICE VISIT (OUTPATIENT)
Dept: INTERNAL MEDICINE | Facility: CLINIC | Age: 43
End: 2018-10-05

## 2018-10-05 VITALS
DIASTOLIC BLOOD PRESSURE: 75 MMHG | WEIGHT: 235.13 LBS | TEMPERATURE: 98 F | HEIGHT: 62 IN | HEART RATE: 75 BPM | BODY MASS INDEX: 43.27 KG/M2 | SYSTOLIC BLOOD PRESSURE: 120 MMHG | OXYGEN SATURATION: 98 %

## 2018-10-05 DIAGNOSIS — Z23 NEED FOR HEPATITIS A VACCINATION: ICD-10-CM

## 2018-10-05 DIAGNOSIS — IMO0002 INSULIN DEPENDENT TYPE 2 DIABETES MELLITUS, UNCONTROLLED: Primary | ICD-10-CM

## 2018-10-05 DIAGNOSIS — E03.9 ACQUIRED HYPOTHYROIDISM: ICD-10-CM

## 2018-10-05 DIAGNOSIS — E66.01 MORBID OBESITY (HCC): ICD-10-CM

## 2018-10-05 LAB
ALBUMIN SERPL-MCNC: 4.1 G/DL (ref 3.5–5)
ALBUMIN/GLOB SERPL: 1.2 G/DL (ref 1–2)
ALP SERPL-CCNC: 88 U/L (ref 38–126)
ALT SERPL-CCNC: 69 U/L (ref 13–69)
AST SERPL-CCNC: 68 U/L (ref 15–46)
BASOPHILS # BLD AUTO: 0.06 10*3/MM3 (ref 0–0.2)
BASOPHILS NFR BLD AUTO: 0.8 % (ref 0–2.5)
BILIRUB SERPL-MCNC: 0.5 MG/DL (ref 0.2–1.3)
BUN SERPL-MCNC: 7 MG/DL (ref 7–20)
BUN/CREAT SERPL: 11.7 (ref 7.1–23.5)
CALCIUM SERPL-MCNC: 9.4 MG/DL (ref 8.4–10.2)
CHLORIDE SERPL-SCNC: 102 MMOL/L (ref 98–107)
CO2 SERPL-SCNC: 25 MMOL/L (ref 26–30)
CREAT SERPL-MCNC: 0.6 MG/DL (ref 0.6–1.3)
EOSINOPHIL # BLD AUTO: 0.34 10*3/MM3 (ref 0–0.7)
EOSINOPHIL NFR BLD AUTO: 4.6 % (ref 0–7)
ERYTHROCYTE [DISTWIDTH] IN BLOOD BY AUTOMATED COUNT: 13.8 % (ref 11.5–14.5)
GLOBULIN SER CALC-MCNC: 3.3 GM/DL
GLUCOSE SERPL-MCNC: 339 MG/DL (ref 74–98)
HBA1C MFR BLD: 10.8 %
HCT VFR BLD AUTO: 42.6 % (ref 37–47)
HGB BLD-MCNC: 13.6 G/DL (ref 12–16)
IMM GRANULOCYTES # BLD: 0.01 10*3/MM3 (ref 0–0.06)
IMM GRANULOCYTES NFR BLD: 0.1 % (ref 0–0.6)
LYMPHOCYTES # BLD AUTO: 2.39 10*3/MM3 (ref 0.6–3.4)
LYMPHOCYTES NFR BLD AUTO: 32.5 % (ref 10–50)
MCH RBC QN AUTO: 27.8 PG (ref 27–31)
MCHC RBC AUTO-ENTMCNC: 31.9 G/DL (ref 30–37)
MCV RBC AUTO: 86.9 FL (ref 81–99)
MONOCYTES # BLD AUTO: 0.46 10*3/MM3 (ref 0–0.9)
MONOCYTES NFR BLD AUTO: 6.3 % (ref 0–12)
NEUTROPHILS # BLD AUTO: 4.09 10*3/MM3 (ref 2–6.9)
NEUTROPHILS NFR BLD AUTO: 55.7 % (ref 37–80)
NRBC BLD AUTO-RTO: 0 /100 WBC (ref 0–0)
PLATELET # BLD AUTO: 118 10*3/MM3 (ref 130–400)
POTASSIUM SERPL-SCNC: 4.3 MMOL/L (ref 3.5–5.1)
PROT SERPL-MCNC: 7.4 G/DL (ref 6.3–8.2)
RBC # BLD AUTO: 4.9 10*6/MM3 (ref 4.2–5.4)
SODIUM SERPL-SCNC: 135 MMOL/L (ref 137–145)
T4 FREE SERPL-MCNC: 0.89 NG/DL (ref 0.78–2.19)
TSH SERPL DL<=0.005 MIU/L-ACNC: 5.31 MIU/ML (ref 0.47–4.68)
WBC # BLD AUTO: 7.35 10*3/MM3 (ref 4.8–10.8)

## 2018-10-05 PROCEDURE — 90471 IMMUNIZATION ADMIN: CPT | Performed by: FAMILY MEDICINE

## 2018-10-05 PROCEDURE — 90632 HEPA VACCINE ADULT IM: CPT | Performed by: FAMILY MEDICINE

## 2018-10-05 PROCEDURE — 83036 HEMOGLOBIN GLYCOSYLATED A1C: CPT | Performed by: FAMILY MEDICINE

## 2018-10-05 PROCEDURE — 99214 OFFICE O/P EST MOD 30 MIN: CPT | Performed by: FAMILY MEDICINE

## 2018-10-05 RX ORDER — CITALOPRAM 20 MG/1
20 TABLET ORAL DAILY
Qty: 30 TABLET | Refills: 5 | Status: SHIPPED | OUTPATIENT
Start: 2018-10-05 | End: 2019-05-13 | Stop reason: SDUPTHER

## 2018-10-05 RX ORDER — CLOTRIMAZOLE 1 %
CREAM (GRAM) TOPICAL 2 TIMES DAILY
Qty: 45 G | Refills: 1 | Status: SHIPPED | OUTPATIENT
Start: 2018-10-05 | End: 2019-02-08

## 2018-10-05 RX ORDER — LEVOTHYROXINE SODIUM 0.2 MG/1
200 TABLET ORAL DAILY
Qty: 30 TABLET | Refills: 3 | Status: SHIPPED | OUTPATIENT
Start: 2018-10-05 | End: 2019-02-11 | Stop reason: SDUPTHER

## 2018-10-08 RX ORDER — LEVOTHYROXINE SODIUM 0.03 MG/1
25 TABLET ORAL DAILY
Qty: 90 TABLET | Refills: 3 | Status: SHIPPED | OUTPATIENT
Start: 2018-10-08 | End: 2019-02-11 | Stop reason: SDUPTHER

## 2018-10-11 NOTE — PROGRESS NOTES
Subjective    Rox Garner is a 42 y.o. female here for:  Chief Complaint   Patient presents with   • Diabetes     follow up for Diabetes and Medication refills.     Diabetes   She presents for her follow-up diabetic visit. She has type 2 diabetes mellitus. No MedicAlert identification noted. The initial diagnosis of diabetes was made 11 years ago. Associated symptoms include fatigue and polydipsia. Pertinent negatives for diabetes include no chest pain. There are no hypoglycemic complications. Symptoms are worsening. Pertinent negatives for diabetic complications include no CVA or heart disease. Risk factors for coronary artery disease include obesity, sedentary lifestyle, diabetes mellitus and stress. Current diabetic treatment includes insulin injections and oral agent (monotherapy). She is compliant with treatment some of the time. She is currently taking insulin at bedtime. Insulin injections are given by patient. Rotation sites for injection include the abdominal wall. Her weight is stable. She is following a generally unhealthy diet. When asked about meal planning, she reported none. She has not had a previous visit with a dietitian. She never participates in exercise. Blood glucose monitoring compliance is inadequate. She does not see a podiatrist.Eye exam is current.   Hypothyroidism   This is a chronic problem. The current episode started more than 1 year ago. The problem occurs daily. Associated symptoms include fatigue. Pertinent negatives include no chest pain. Treatments tried: levothyroxine. The treatment provided significant relief.          The following portions of the patient's history were reviewed and updated as appropriate: allergies, current medications, past family history, past medical history, past social history, past surgical history and problem list.    Review of Systems   Constitutional: Positive for fatigue. Negative for activity change.   Cardiovascular: Negative for chest  "pain.   Endocrine: Positive for polydipsia.   Psychiatric/Behavioral: Positive for stress.       Vitals:    10/05/18 1409   BP: 120/75   Pulse: 75   Temp: 98 °F (36.7 °C)   SpO2: 98%   Weight: 107 kg (235 lb 2 oz)   Height: 157.5 cm (62.01\")         Objective   Physical Exam   Constitutional: She is oriented to person, place, and time. Vital signs are normal. She appears well-developed and well-nourished. She is active.  Non-toxic appearance. She does not appear ill. No distress. She is morbidly obese.  HENT:   Head: Normocephalic and atraumatic. Hair is normal.   Right Ear: Hearing and external ear normal.   Left Ear: Hearing and external ear normal.   Nose: Nose normal.   Mouth/Throat: Mucous membranes are not dry.   Eyes: Pupils are equal, round, and reactive to light. EOM and lids are normal. No scleral icterus.   Neck: Trachea normal and phonation normal. Neck supple.   Cardiovascular: Normal rate, regular rhythm and normal heart sounds.    No murmur heard.  Pulmonary/Chest: Effort normal and breath sounds normal. No stridor.   Musculoskeletal: She exhibits no edema or deformity.   Neurological: She is alert and oriented to person, place, and time. She displays no tremor. No cranial nerve deficit. Gait normal.   Skin: Skin is warm and dry. No rash noted. She is not diaphoretic. No cyanosis. Nails show no clubbing.   Psychiatric: She has a normal mood and affect. Her speech is normal and behavior is normal. Judgment and thought content normal. Cognition and memory are normal.   Nursing note and vitals reviewed.      Lab Results   Component Value Date    HGBA1C 10.8 10/05/2018       Assessment/Plan     Problem List Items Addressed This Visit        Digestive    Morbid obesity (CMS/HCC)       Endocrine    Insulin dependent type 2 diabetes mellitus, uncontrolled (CMS/[facility]) - Primary    Overview     · Failed metformin xr due to GI side effects         Current Assessment & Plan     Diabetes is uncontrolled.   Diabetic " diet. Checking sugar levels and keeping log. Samples of Jardiance and Ozempic given. Told Tradjenta for now.  Diabetes will be reassessed in 3 months.         Relevant Medications    Insulin Glargine (TOUJEO SOLOSTAR SC)    Semaglutide (OZEMPIC) 1 MG/DOSE solution pen-injector    Empagliflozin (JARDIANCE) 10 MG tablet    Empagliflozin (JARDIANCE) 10 MG tablet    Semaglutide 0.25 or 0.5 MG/DOSE solution pen-injector    Other Relevant Orders    POC Glycosylated Hemoglobin (Hb A1C) (Completed)    Comprehensive Metabolic Panel (Completed)    Acquired hypothyroidism    Relevant Medications    levothyroxine (SYNTHROID, LEVOTHROID) 200 MCG tablet    Other Relevant Orders    TSH (Completed)    T4, Free (Completed)    CBC & Differential (Completed)      Other Visit Diagnoses     Need for hepatitis A vaccination        Relevant Medications    hepatitis A (HAVRIX) vaccine 1,440 Units    Other Relevant Orders    Hepatitis A Vaccine Adult IM (Completed)          · Patient's Body mass index is 42.99 kg/m². BMI is above normal parameters. Recommendations include: nutrition counseling and pharmacological intervention.  ·     Return in about 4 months (around 1/21/2019) for Diabetes follow up.    Fior Ramirez MD

## 2018-10-11 NOTE — ASSESSMENT & PLAN NOTE
Diabetes is uncontrolled.   Diabetic diet. Checking sugar levels and keeping log. Samples of Jardiance and Ozempic given. Told Tradjenta for now.  Diabetes will be reassessed in 3 months.

## 2018-10-22 ENCOUNTER — TELEPHONE (OUTPATIENT)
Dept: INTERNAL MEDICINE | Facility: CLINIC | Age: 43
End: 2018-10-22

## 2018-10-22 NOTE — TELEPHONE ENCOUNTER
Usman from Pharmacy benefits called regarding Ms. Garner. He just wanted to see if we received a PA for the patients wendi.      Usman can be reached at [phone]    Ref # [id number]

## 2018-10-22 NOTE — TELEPHONE ENCOUNTER
Krystal,  Pharmacy benefit department called about PA for pt that was faxed over needing notes fax# [phone] phone   [id number] reference #

## 2018-10-24 NOTE — TELEPHONE ENCOUNTER
I spoke with Ankur at [facility] [employer]. He is going to refax the paperwork that the company is request.

## 2018-10-29 ENCOUNTER — TELEPHONE (OUTPATIENT)
Dept: INTERNAL MEDICINE | Facility: CLINIC | Age: 43
End: 2018-10-29

## 2018-10-29 NOTE — TELEPHONE ENCOUNTER
Krystal from Pharmacy benefits department left a VM on refill request line wanting to follow up about pts prior auth for Ozempic. She states they also want chart notes, and to use the following reference # : [id number]

## 2018-10-30 NOTE — TELEPHONE ENCOUNTER
Krystal,  Pharmacy benefit department called about PA for pt that was faxed over needing chart notes & labs her fax# [phone] phone   [id number] reference #    I advised her that I had passed the message along and you are just waiting on Dr. Ramirez's signature.

## 2019-01-21 ENCOUNTER — PRIOR AUTHORIZATION (OUTPATIENT)
Dept: INTERNAL MEDICINE | Facility: CLINIC | Age: 44
End: 2019-01-21

## 2019-02-08 ENCOUNTER — OFFICE VISIT (OUTPATIENT)
Dept: INTERNAL MEDICINE | Facility: CLINIC | Age: 44
End: 2019-02-08

## 2019-02-08 VITALS
WEIGHT: 233.5 LBS | DIASTOLIC BLOOD PRESSURE: 88 MMHG | HEIGHT: 62 IN | OXYGEN SATURATION: 99 % | TEMPERATURE: 97.9 F | HEART RATE: 87 BPM | SYSTOLIC BLOOD PRESSURE: 134 MMHG | BODY MASS INDEX: 42.97 KG/M2 | RESPIRATION RATE: 16 BRPM

## 2019-02-08 DIAGNOSIS — IMO0002 INSULIN DEPENDENT TYPE 2 DIABETES MELLITUS, UNCONTROLLED: Primary | ICD-10-CM

## 2019-02-08 DIAGNOSIS — E66.01 MORBID OBESITY (HCC): ICD-10-CM

## 2019-02-08 DIAGNOSIS — E03.9 ACQUIRED HYPOTHYROIDISM: ICD-10-CM

## 2019-02-08 LAB
HBA1C MFR BLD: 9 %
T4 FREE SERPL-MCNC: 1.1 NG/DL (ref 0.78–2.19)
TSH SERPL DL<=0.005 MIU/L-ACNC: 3.67 MIU/ML (ref 0.47–4.68)

## 2019-02-08 PROCEDURE — 99214 OFFICE O/P EST MOD 30 MIN: CPT | Performed by: FAMILY MEDICINE

## 2019-02-08 PROCEDURE — 83036 HEMOGLOBIN GLYCOSYLATED A1C: CPT | Performed by: FAMILY MEDICINE

## 2019-02-08 RX ORDER — HYDROXYZINE HYDROCHLORIDE 10 MG/1
10 TABLET, FILM COATED ORAL 3 TIMES DAILY PRN
Qty: 270 TABLET | Refills: 4 | Status: SHIPPED | OUTPATIENT
Start: 2019-02-08 | End: 2021-04-14 | Stop reason: SDUPTHER

## 2019-02-08 NOTE — PROGRESS NOTES
Subjective    Rox Garner is a 43 y.o. female here for:  Chief Complaint   Patient presents with   • Diabetes     4 mo f/u pt states no problems or concerns    • Obesity     f/u   • Hypothyroidism     f/u        Morbid obesity: She has been able to lose some weight since last visit, her clothes are fitting much looser than previously.    She used the sample of ozempic but pharmacy did not feel that other prescription sent so she only used for about a month.      Diabetes   She presents for her follow-up diabetic visit. She has type 2 diabetes mellitus. No MedicAlert identification noted. The initial diagnosis of diabetes was made 11 years ago. Associated symptoms include fatigue and weight loss. Pertinent negatives for diabetes include no chest pain. There are no hypoglycemic complications. Pertinent negatives for diabetic complications include no CVA, heart disease or nephropathy. Risk factors for coronary artery disease include obesity, sedentary lifestyle, diabetes mellitus and stress. Current diabetic treatment includes insulin injections and oral agent (monotherapy). She is compliant with treatment most of the time. She is currently taking insulin at bedtime. Insulin injections are given by patient. Rotation sites for injection include the abdominal wall. She is following a generally unhealthy diet. When asked about meal planning, she reported none. She has not had a previous visit with a dietitian. She never participates in exercise. Blood glucose monitoring compliance is inadequate. She does not see a podiatrist.Eye exam is current.   Hypothyroidism   This is a chronic problem. The current episode started more than 1 year ago. The problem occurs daily. Associated symptoms include fatigue. Pertinent negatives include no chest pain or nausea. Treatments tried: levothyroxine. The treatment provided significant relief.          The following portions of the patient's history were reviewed and updated  "as appropriate: allergies, current medications, past family history, past medical history, past social history, past surgical history and problem list.    Review of Systems   Constitutional: Positive for fatigue and unexpected weight loss.   Cardiovascular: Negative for chest pain.   Gastrointestinal: Negative for nausea.   Psychiatric/Behavioral: Positive for stress.       Vitals:    02/08/19 1044   BP: 134/88   Pulse: 87   Resp: 16   Temp: 97.9 °F (36.6 °C)   TempSrc: Temporal   SpO2: 99%   Weight: 106 kg (233 lb 8 oz)   Height: 157.5 cm (62.01\")         Objective   Physical Exam   Constitutional: She is oriented to person, place, and time. Vital signs are normal. She appears well-developed and well-nourished. She is active.  Non-toxic appearance. She does not appear ill. No distress. She is morbidly obese.  HENT:   Head: Normocephalic and atraumatic. Hair is normal.   Right Ear: Hearing and external ear normal.   Left Ear: Hearing and external ear normal.   Nose: Nose normal.   Mouth/Throat: Mucous membranes are not dry.   Eyes: EOM and lids are normal. Pupils are equal, round, and reactive to light. No scleral icterus.   Neck: Trachea normal and phonation normal. Neck supple.   Cardiovascular: Normal rate, regular rhythm and normal heart sounds.   No murmur heard.  Pulmonary/Chest: Effort normal and breath sounds normal. No stridor.   Musculoskeletal: She exhibits no edema or deformity.   Neurological: She is alert and oriented to person, place, and time. She displays no tremor. No cranial nerve deficit. Gait normal.   Skin: Skin is warm and dry. No rash noted. She is not diaphoretic. No cyanosis. Nails show no clubbing.   Psychiatric: She has a normal mood and affect. Her speech is normal and behavior is normal. Judgment and thought content normal. Cognition and memory are normal.   Nursing note and vitals reviewed.      Lab Results   Component Value Date    HGBA1C 9.0 02/08/2019    HGBA1C 10.8 10/05/2018    " HGBA1C 11.0 12/07/2017     Lab Results   Component Value Date    TSH 5.310 (H) 10/05/2018     Lab Results   Component Value Date    FREET4 0.89 10/05/2018         Assessment/Plan     Problem List Items Addressed This Visit        Digestive    Morbid obesity (CMS/[facility])       Endocrine    Insulin dependent type 2 diabetes mellitus, uncontrolled (CMS/[facility]) - Primary    Overview     · Failed metformin xr due to GI side effects         Relevant Medications    Semaglutide 1 MG/DOSE solution pen-injector    Empagliflozin 25 MG tablet    Insulin Glargine (TOUJEO SOLOSTAR) 300 UNIT/ML solution pen-injector    Other Relevant Orders    POC Glycosylated Hemoglobin (Hb A1C) (Completed)    POC Microalbumin    Acquired hypothyroidism    Relevant Orders    TSH    T4, Free          · Patient's Body mass index is 42.7 kg/m². BMI is above normal parameters. Recommendations include: pharmacological intervention.  · Increased Jardiance, refilled ozempic, continue insulin     Return in about 3 months (around 5/15/2019) for Diabetes follow up.    Fior Ramirez MD

## 2019-02-08 NOTE — PATIENT INSTRUCTIONS
Serving Sizes  A serving size is a measured amount of food or drink, such as one slice of bread, that has an associated nutrient content. Knowing the serving size of a food or drink can help you determine how much of that food you should consume.  What is the size of one serving?  The size of one healthy serving depends on the food or drink. To determine a serving size, read the food label. If the food or drink does not have a food label, try to find serving size information online. Or, use the following to estimate the size of one adult serving:  Grain  1 slice bread. ½ bagel. ½ cup pasta.  Vegetable  ½ cup cooked or canned vegetables. 1 cup raw, leafy greens.  Fruit  ½ cup canned fruit. 1 medium fruit. ¼ cup dried fruit.  Meat and Other Protein Sources  1 oz meat, poultry, or fish. ¼ cup cooked beans. 1 egg. ¼ cup nuts or seeds. 1 Tbsp nut butter. ¼ cup tofu or tempeh. 2 Tbsp hummus.  Dairy  An individual container of yogurt (6-8 oz). 1 piece of cheese the size of your thumb (1 oz). 1 cup (8 oz) milk or milk alternative.  Fat  A piece the size of one dice. 1 tsp soft margarine. 1 Tbsp mayonnaise. 1 tsp vegetable oil. 1 Tbsp regular salad dressing. 2 Tbsp low-fat salad dressing.  How many servings should I eat from each food group each day?  The following are the suggested number of servings to try and have every day from each food group. You can also look at your eating throughout the week and aim for meeting these requirements on most days for overall healthy eating.  Grain  6-8 servings. Try to have half of your grains from whole grains, such as whole wheat bread, corn tortillas, oatmeal, brown rice, whole wheat pasta, and bulgur.  Vegetable  At least 2½-3 servings.  Fruit  2 servings.  Meat and Other Protein Foods  5-6 servings. Aim to have lean proteins, such as chicken, turkey, fish, beans, or tofu.  Dairy  3 servings. Choose low-fat or nonfat if you are trying to control your weight.  Fat  2-3 servings.  Is  a serving the same thing as a portion?  No. A portion is the actual amount you eat, which may be more than one serving. Knowing the specific serving size of a food and the nutritional information that goes with it can help you make a healthy decision on what size portion to eat.  What are some tips to help me learn healthy serving sizes?  · Check food labels for serving sizes. Many foods that come as a single portion actually contain multiple servings.  · Determine the serving size of foods you commonly eat and figure out how large a portion you usually eat.  · Measure the number of servings that can be held by the bowls, glasses, cups, and plates you typically use. For example, pour your breakfast cereal into your regular bowl and then pour it into a measuring cup.  · For 1-2 days, measure the serving sizes of all the foods you eat.  · Practice estimating serving sizes and determining how big your portions should be.      This information is not intended to replace advice given to you by your health care provider. Make sure you discuss any questions you have with your health care provider.  Document Released: 09/15/2004 Document Revised: 08/12/2017 Document Reviewed: 03/17/2015  [employer] Interactive Patient Education © 2018 [employer]    MyPlate from [employer]    The general, healthful diet is based on the 2010 Dietary Guidelines for Americans. The amount of food you need to eat from each food group depends on your age, sex, and level of physical activity and can be individualized by a dietitian. Go to ChooseMyPlate.gov for more information.  What do I need to know about the MyPlate plan?  · Enjoy your food, but eat less.  · Avoid oversized portions.  ? ½ of your plate should include fruits and vegetables.  ? ¼ of your plate should be grains.  ? ¼ of your plate should be protein.  Grains  · Make at least half of your grains whole grains.  · For a 2,000 calorie daily food plan, eat 6 oz every day.  · 1 oz is about 1  slice bread, 1 cup cereal, or ½ cup cooked rice, cereal, or pasta.  Vegetables  · Make half your plate fruits and vegetables.  · For a 2,000 calorie daily food plan, eat 2½ cups every day.  · 1 cup is about 1 cup raw or cooked vegetables or vegetable juice or 2 cups raw leafy greens.  Fruits  · Make half your plate fruits and vegetables.  · For a 2,000 calorie daily food plan, eat 2 cups every day.  · 1 cup is about 1 cup fruit or 100% fruit juice or ½ cup dried fruit.  Protein  · For a 2,000 calorie daily food plan, eat 5½ oz every day.  · 1 oz is about 1 oz meat, poultry, or fish, ¼ cup cooked beans, 1 egg, 1 Tbsp peanut butter, or ½ oz nuts or seeds.  Dairy  · Switch to fat-free or low-fat (1%) milk.  · For a 2,000 calorie daily food plan, eat 3 cups every day.  · 1 cup is about 1 cup milk or yogurt or soy milk (soy beverage), 1½ oz natural cheese, or 2 oz processed cheese.  Fats, Oils, and Empty Calories  · Only small amounts of oils are recommended.  · Empty calories are calories from solid fats or added sugars.  · Compare sodium in foods like soup, bread, and frozen meals. Choose the foods with lower numbers.  · Drink water instead of sugary drinks.  What foods can I eat?  Grains  Whole grains such as whole wheat, quinoa, millet, and bulgur. Bread, rolls, and pasta made from whole grains. Brown or wild rice. Hot or cold cereals made from whole grains and without added sugar.  Vegetables  All fresh vegetables, especially fresh red, dark green, or orange vegetables. Peas and beans. Low-sodium frozen or canned vegetables prepared without added salt. Low-sodium vegetable juices.  Fruits  All fresh, frozen, and dried fruits. Canned fruit packed in water or fruit juice without added sugar. Fruit juices without added sugar.  Meats and Other Protein Sources  Boiled, baked, or grilled lean meat trimmed of fat. Skinless poultry. Fresh seafood and shellfish. Canned seafood packed in water. Unsalted nuts and unsalted nut  butters. Tofu. Dried beans and pea. Eggs.  Dairy  Low-fat or fat-free milk, yogurt, and cheeses.  Sweets and Desserts  Frozen desserts made from low-fat milk.  Fats and Oils  Olive, peanut, and canola oils and margarine. Salad dressing and mayonnaise made from these oils.  Other  Soups and casseroles made from allowed ingredients and without added fat or salt.  The items listed above may not be a complete list of recommended foods or beverages. Contact your dietitian for more options.  What foods are not recommended?  Grains  Sweetened, low-fiber cereals. Packaged baked goods. Snack crackers and chips. Cheese crackers, butter crackers, and biscuits. Frozen waffles, sweet breads, doughnuts, pastries, packaged baking mixes, pancakes, cakes, and cookies.  Vegetables  Regular canned or frozen vegetables or vegetables prepared with salt. Canned tomatoes. Canned tomato sauce. Fried vegetables. Vegetables in cream sauce or cheese sauce.  Fruits  Fruits packed in syrup or made with added sugar.  Meats and Other Protein Sources  Marbled or fatty meats such as ribs. Poultry with skin. Fried meats, poultry, eggs, or fish. Sausages, hot dogs, and deli meats such as pastrami, bologna, or salami.  Dairy  Whole milk, cream, cheeses made from whole milk, sour cream. Ice cream or yogurt made from whole milk or with added sugar.  Beverages  For adults, no more than one alcoholic drink per day. Regular soft drinks or other sugary beverages. Juice drinks.  Sweets and Desserts  Sugary or fatty desserts, candy, and other sweets.  Fats and Oils  Solid shortening or partially hydrogenated oils. Solid margarine. Margarine that contains trans fats. Butter.    The items listed above may not be a complete list of foods and beverages to avoid. Contact your dietitian for more information.    This information is not intended to replace advice given to you by your health care provider. Make sure you discuss any questions you have with your health  care provider.  Document Released: 01/06/2009 Document Revised: 05/25/2017 Document Reviewed: 11/26/2014  [employer] Interactive Patient Education © 2018 [employer]        Calorie Counting for Weight Loss  Calories are units of energy. Your body needs a certain amount of calories from food to keep you going throughout the day. When you eat more calories than your body needs, your body stores the extra calories as fat. When you eat fewer calories than your body needs, your body burns fat to get the energy it needs.  Calorie counting means keeping track of how many calories you eat and drink each day. Calorie counting can be helpful if you need to lose weight. If you make sure to eat fewer calories than your body needs, you should lose weight. Ask your health care provider what a healthy weight is for you.  For calorie counting to work, you will need to eat the right number of calories in a day in order to lose a healthy amount of weight per week. A dietitian can help you determine how many calories you need in a day and will give you suggestions on how to reach your calorie goal.  · A healthy amount of weight to lose per week is usually 1-2 lb (0.5-0.9 kg). This usually means that your daily calorie intake should be reduced by 500-750 calories.  · Eating 1,200 - 1,500 calories per day can help most women lose weight.  · Eating 1,500 - 1,800 calories per day can help most men lose weight.     What do I need to know about calorie counting?  In order to meet your daily calorie goal, you will need to:  · Find out how many calories are in each food you would like to eat. Try to do this before you eat.  · Decide how much of the food you plan to eat.  · Write down what you ate and how many calories it had. Doing this is called keeping a food log.     To successfully lose weight, it is important to balance calorie counting with a healthy lifestyle that includes regular activity. Aim for 150 minutes of moderate exercise  (such as walking) or 75 minutes of vigorous exercise (such as running) each week.  Where do I find calorie information?       The number of calories in a food can be found on a Nutrition Facts label. If a food does not have a Nutrition Facts label, try to look up the calories online or ask your dietitian for help.  Remember that calories are listed per serving. If you choose to have more than one serving of a food, you will have to multiply the calories per serving by the amount of servings you plan to eat. For example, the label on a package of bread might say that a serving size is 1 slice and that there are 90 calories in a serving. If you eat 1 slice, you will have eaten 90 calories. If you eat 2 slices, you will have eaten 180 calories.  How do I keep a food log?  Immediately after each meal, record the following information in your food log:  · What you ate. Don't forget to include toppings, sauces, and other extras on the food.  · How much you ate. This can be measured in cups, ounces, or number of items.  · How many calories each food and drink had.  · The total number of calories in the meal.     Keep your food log near you, such as in a small notebook in your pocket, or use a mobile zhou or website. Some programs will calculate calories for you and show you how many calories you have left for the day to meet your goal.  What are some calorie counting tips?  · Use your calories on foods and drinks that will fill you up and not leave you hungry:  ? Some examples of foods that fill you up are nuts and nut butters, vegetables, lean proteins, and high-fiber foods like whole grains. High-fiber foods are foods with more than 5 g fiber per serving.  ? Drinks such as sodas, specialty coffee drinks, alcohol, and juices have a lot of calories, yet do not fill you up.  · Eat nutritious foods and avoid empty calories. Empty calories are calories you get from foods or beverages that do not have many vitamins or protein,  "such as candy, sweets, and soda. It is better to have a nutritious high-calorie food (such as an avocado) than a food with few nutrients (such as a bag of chips).  · Know how many calories are in the foods you eat most often. This will help you calculate calorie counts faster.  · Pay attention to calories in drinks. Low-calorie drinks include water and unsweetened drinks.  · Pay attention to nutrition labels for \"low fat\" or \"fat free\" foods. These foods sometimes have the same amount of calories or more calories than the full fat versions. They also often have added sugar, starch, or salt, to make up for flavor that was removed with the fat.  ·   · Find a way of tracking calories that works for you. Get creative. Try different apps or programs if writing down calories does not work for you.  What are some portion control tips?  · Know how many calories are in a serving. This will help you know how many servings of a certain food you can have.  · Use a measuring cup to measure serving sizes. You could also try weighing out portions on a kitchen scale. With time, you will be able to estimate serving sizes for some foods.  · Take some time to put servings of different foods on your favorite plates, bowls, and cups so you know what a serving looks like.  · Try not to eat straight from a bag or box. Doing this can lead to overeating. Put the amount you would like to eat in a cup or on a plate to make sure you are eating the right portion.  · Use smaller plates, glasses, and bowls to prevent overeating.  · Try not to multitask (for example, watch TV or use your computer) while eating. If it is time to eat, sit down at a table and enjoy your food. This will help you to know when you are full. It will also help you to be aware of what you are eating and how much you are eating.  What are tips for following this plan?  Reading food labels  · Check the calorie count compared to the serving size. The serving size may be " smaller than what you are used to eating.  · Check the source of the calories. Make sure the food you are eating is high in vitamins and protein and low in saturated and trans fats.  Shopping  · Read nutrition labels while you shop. This will help you make healthy decisions before you decide to purchase your food.  · Make a grocery list and stick to it.  Cooking  · Try to cook your favorite foods in a healthier way. For example, try baking instead of frying.  · Use low-fat dairy products.  Meal planning  · Use more fruits and vegetables. Half of your plate should be fruits and vegetables.  · Include lean proteins like poultry and fish.  How do I count calories when eating out?  · Ask for smaller portion sizes.  · Consider sharing an entree and sides instead of getting your own entree.  · If you get your own entree, eat only half. Ask for a box at the beginning of your meal and put the rest of your entree in it so you are not tempted to eat it.  · If calories are listed on the menu, choose the lower calorie options.  · Choose dishes that include vegetables, fruits, whole grains, low-fat dairy products, and lean protein.  · Choose items that are boiled, broiled, grilled, or steamed. Stay away from items that are buttered, battered, fried, or served with cream sauce. Items labeled “crispy” are usually fried, unless stated otherwise.  · Choose water, low-fat milk, unsweetened iced tea, or other drinks without added sugar. If you want an alcoholic beverage, choose a lower calorie option such as a glass of wine or light beer.  · Ask for dressings, sauces, and syrups on the side. These are usually high in calories, so you should limit the amount you eat.  · If you want a salad, choose a garden salad and ask for grilled meats. Avoid extra toppings like plummer, cheese, or fried items. Ask for the dressing on the side, or ask for olive oil and vinegar or lemon to use as dressing.  · Estimate how many servings of a food you are  given. For example, a serving of cooked rice is ½ cup or about the size of half a baseball. Knowing serving sizes will help you be aware of how much food you are eating at restaurants. The list below tells you how big or small some common portion sizes are based on everyday objects:  ? 1 oz--4 stacked dice.  ? 3 oz--1 deck of cards.  ? 1 tsp--1 die.  ? 1 Tbsp--½ a ping-pong ball.  ? 2 Tbsp--1 ping-pong ball.  ? ½ cup--½ baseball.  ? 1 cup--1 baseball.  Summary  · Calorie counting means keeping track of how many calories you eat and drink each day. If you eat fewer calories than your body needs, you should lose weight.  · A healthy amount of weight to lose per week is usually 1-2 lb (0.5-0.9 kg). This usually means reducing your daily calorie intake by 500-750 calories.  · The number of calories in a food can be found on a Nutrition Facts label. If a food does not have a Nutrition Facts label, try to look up the calories online or ask your dietitian for help.  · Use your calories on foods and drinks that will fill you up, and not on foods and drinks that will leave you hungry.  · Use smaller plates, glasses, and bowls to prevent overeating.      This information is not intended to replace advice given to you by your health care provider. Make sure you discuss any questions you have with your health care provider.  Document Released: 12/18/2006 Document Revised: 11/17/2017 Document Reviewed: 11/17/2017  [employer] Interactive Patient Education © 2018 [employer]

## 2019-02-11 DIAGNOSIS — E03.9 ACQUIRED HYPOTHYROIDISM: ICD-10-CM

## 2019-02-11 RX ORDER — LEVOTHYROXINE SODIUM 0.03 MG/1
25 TABLET ORAL DAILY
Qty: 90 TABLET | Refills: 3 | Status: SHIPPED | OUTPATIENT
Start: 2019-02-11 | End: 2020-06-17 | Stop reason: SDUPTHER

## 2019-02-11 RX ORDER — LEVOTHYROXINE SODIUM 0.2 MG/1
200 TABLET ORAL DAILY
Qty: 90 TABLET | Refills: 3 | Status: SHIPPED | OUTPATIENT
Start: 2019-02-11 | End: 2020-06-17 | Stop reason: SDUPTHER

## 2019-02-12 ENCOUNTER — TRANSCRIBE ORDERS (OUTPATIENT)
Dept: INTERNAL MEDICINE | Facility: CLINIC | Age: 44
End: 2019-02-12

## 2019-02-12 DIAGNOSIS — Z12.31 VISIT FOR SCREENING MAMMOGRAM: Primary | ICD-10-CM

## 2019-02-13 ENCOUNTER — PRIOR AUTHORIZATION (OUTPATIENT)
Dept: INTERNAL MEDICINE | Facility: CLINIC | Age: 44
End: 2019-02-13

## 2019-02-13 ENCOUNTER — PATIENT MESSAGE (OUTPATIENT)
Dept: INTERNAL MEDICINE | Facility: CLINIC | Age: 44
End: 2019-02-13

## 2019-02-13 DIAGNOSIS — M79.622 LEFT UPPER ARM PAIN: Primary | ICD-10-CM

## 2019-02-15 ENCOUNTER — APPOINTMENT (OUTPATIENT)
Dept: ULTRASOUND IMAGING | Facility: HOSPITAL | Age: 44
End: 2019-02-15

## 2019-02-26 ENCOUNTER — PATIENT MESSAGE (OUTPATIENT)
Dept: INTERNAL MEDICINE | Facility: CLINIC | Age: 44
End: 2019-02-26

## 2019-02-26 DIAGNOSIS — IMO0002 INSULIN DEPENDENT TYPE 2 DIABETES MELLITUS, UNCONTROLLED: Primary | ICD-10-CM

## 2019-02-26 NOTE — TELEPHONE ENCOUNTER
From: Rox Garner  To: Fior Ramirez MD  Sent: 2/26/2019 12:31 PM EST  Subject: Prescription Question    Can u send me some test strips in for one touch ultra   Mini to [facility] I have been shaky   With new dose Ozempic so I can  after work

## 2019-04-12 ENCOUNTER — HOSPITAL ENCOUNTER (OUTPATIENT)
Dept: MAMMOGRAPHY | Facility: HOSPITAL | Age: 44
Discharge: HOME | End: 2019-04-12
Admitting: FAMILY MEDICINE

## 2019-04-12 DIAGNOSIS — Z12.31 VISIT FOR SCREENING MAMMOGRAM: ICD-10-CM

## 2019-04-12 PROCEDURE — 77063 BREAST TOMOSYNTHESIS BI: CPT

## 2019-04-12 PROCEDURE — 77067 SCR MAMMO BI INCL CAD: CPT | Performed by: RADIOLOGY

## 2019-04-12 PROCEDURE — 77063 BREAST TOMOSYNTHESIS BI: CPT | Performed by: RADIOLOGY

## 2019-04-12 PROCEDURE — 77067 SCR MAMMO BI INCL CAD: CPT

## 2019-04-17 DIAGNOSIS — E03.9 ACQUIRED HYPOTHYROIDISM: ICD-10-CM

## 2019-04-17 RX ORDER — LEVOTHYROXINE SODIUM 0.2 MG/1
TABLET ORAL
Qty: 90 TABLET | Refills: 1 | Status: SHIPPED | OUTPATIENT
Start: 2019-04-17 | End: 2019-09-25 | Stop reason: SDUPTHER

## 2019-05-13 ENCOUNTER — TRANSCRIBE ORDERS (OUTPATIENT)
Dept: ADMINISTRATIVE | Facility: HOSPITAL | Age: 44
End: 2019-05-13

## 2019-05-13 DIAGNOSIS — J01.81 OTHER ACUTE RECURRENT SINUSITIS: Primary | ICD-10-CM

## 2019-05-13 RX ORDER — CITALOPRAM 20 MG/1
20 TABLET ORAL DAILY
Qty: 30 TABLET | Refills: 5 | Status: SHIPPED | OUTPATIENT
Start: 2019-05-13 | End: 2020-06-17 | Stop reason: SDUPTHER

## 2019-07-16 ENCOUNTER — PATIENT MESSAGE (OUTPATIENT)
Dept: INTERNAL MEDICINE | Facility: CLINIC | Age: 44
End: 2019-07-16

## 2019-07-16 RX ORDER — ONDANSETRON 8 MG/1
8 TABLET, ORALLY DISINTEGRATING ORAL EVERY 8 HOURS PRN
Qty: 30 TABLET | Refills: 3 | Status: SHIPPED | OUTPATIENT
Start: 2019-07-16 | End: 2021-06-30

## 2019-07-16 NOTE — TELEPHONE ENCOUNTER
From: Rox Garner  To: Fior Ramirez MD  Sent: 7/16/2019 9:48 AM EDT  Subject: Prescription Question    I have an apt on 7-31 been working to much not able get   In I checked A1C today was 9.3 I was on month steroids   Raised it up I done a small dose of ozempic last wed and   I been sick ever since severe abdominal pain vomiting u   Name it was wondering if u would send me in some   Zofran to [facility] in [place]. On the other hand   I have lost about 25 pounds   Rox

## 2019-09-25 ENCOUNTER — OFFICE VISIT (OUTPATIENT)
Dept: INTERNAL MEDICINE | Facility: CLINIC | Age: 44
End: 2019-09-25

## 2019-09-25 VITALS
HEART RATE: 81 BPM | SYSTOLIC BLOOD PRESSURE: 132 MMHG | WEIGHT: 226.13 LBS | HEIGHT: 62 IN | TEMPERATURE: 97.4 F | OXYGEN SATURATION: 97 % | RESPIRATION RATE: 16 BRPM | BODY MASS INDEX: 41.61 KG/M2 | DIASTOLIC BLOOD PRESSURE: 84 MMHG

## 2019-09-25 DIAGNOSIS — IMO0002 INSULIN DEPENDENT TYPE 2 DIABETES MELLITUS, UNCONTROLLED: Primary | ICD-10-CM

## 2019-09-25 DIAGNOSIS — E03.9 ACQUIRED HYPOTHYROIDISM: ICD-10-CM

## 2019-09-25 DIAGNOSIS — M25.50 POLYARTHRALGIA: ICD-10-CM

## 2019-09-25 DIAGNOSIS — Z23 NEED FOR HEPATITIS A IMMUNIZATION: ICD-10-CM

## 2019-09-25 DIAGNOSIS — R53.83 FATIGUE, UNSPECIFIED TYPE: ICD-10-CM

## 2019-09-25 DIAGNOSIS — R73.9 HYPERGLYCEMIA: ICD-10-CM

## 2019-09-25 DIAGNOSIS — E55.9 VITAMIN D DEFICIENCY: ICD-10-CM

## 2019-09-25 LAB
HBA1C MFR BLD: 8.6 %
POC CREATININE URINE: 100
POC MICROALBUMIN URINE: 30

## 2019-09-25 PROCEDURE — 90632 HEPA VACCINE ADULT IM: CPT | Performed by: FAMILY MEDICINE

## 2019-09-25 PROCEDURE — 83036 HEMOGLOBIN GLYCOSYLATED A1C: CPT | Performed by: FAMILY MEDICINE

## 2019-09-25 PROCEDURE — 99214 OFFICE O/P EST MOD 30 MIN: CPT | Performed by: FAMILY MEDICINE

## 2019-09-25 PROCEDURE — 82044 UR ALBUMIN SEMIQUANTITATIVE: CPT | Performed by: FAMILY MEDICINE

## 2019-09-25 PROCEDURE — 90471 IMMUNIZATION ADMIN: CPT | Performed by: FAMILY MEDICINE

## 2019-09-25 RX ORDER — FLASH GLUCOSE SENSOR
1 KIT MISCELLANEOUS ONCE
Qty: 1 EACH | Refills: 0 | Status: SHIPPED | OUTPATIENT
Start: 2019-09-25 | End: 2019-09-25

## 2019-09-25 RX ORDER — FLASH GLUCOSE SENSOR
1 KIT MISCELLANEOUS
Qty: 2 EACH | Refills: 6 | Status: SHIPPED | OUTPATIENT
Start: 2019-09-25 | End: 2020-06-17

## 2019-09-25 NOTE — PROGRESS NOTES
Subjective    Rox Garner is a 43 y.o. female here for:  Chief Complaint   Patient presents with   • Diabetes     f/u    • Arthritis     pt would like to discuss lab work being drawn        Morbid obesity: chronic daily issue that has improved as she's worked to improve diabetes mellitus.     Diabetes mellitus is chronic and uncontrolled. She tried and failed ozempic and Trulicity due to abdominal pain. No history pancreatitis. She has poor compliance with monitoring glucose levels.     Arthritis: recurrent issue that seems to be worsening. Has many joints that are bothersome, ache. No known rheumatoid arthritis or sle.    Hypothyroidism, acquired, chronic daily issue that has been uncontrolled previous labs. On levothyroxine.      Diabetes   She presents for her follow-up diabetic visit. She has type 2 diabetes mellitus. No MedicAlert identification noted. The initial diagnosis of diabetes was made 11 years ago. Associated symptoms include fatigue and weight loss. Pertinent negatives for diabetes include no chest pain. There are no hypoglycemic complications. Pertinent negatives for diabetic complications include no CVA, heart disease or nephropathy. Risk factors for coronary artery disease include obesity, sedentary lifestyle, diabetes mellitus and stress. Current diabetic treatment includes insulin injections and oral agent (monotherapy). She is compliant with treatment most of the time. She is currently taking insulin at bedtime. Insulin injections are given by patient. Rotation sites for injection include the abdominal wall. She is following a generally unhealthy diet. When asked about meal planning, she reported none. She has not had a previous visit with a dietitian. She never participates in exercise. Blood glucose monitoring compliance is inadequate. She does not see a podiatrist.Eye exam is current.   Hypothyroidism   This is a chronic problem. The current episode started more than 1 year ago.  "The problem occurs daily. Associated symptoms include arthralgias, fatigue and joint swelling. Pertinent negatives include no chest pain or nausea. Treatments tried: levothyroxine. The treatment provided significant relief.          The following portions of the patient's history were reviewed and updated as appropriate: allergies, current medications, past family history, past medical history, past social history, past surgical history and problem list.    Review of Systems   Constitutional: Positive for fatigue and unexpected weight loss.   Cardiovascular: Negative for chest pain.   Gastrointestinal: Negative for nausea.   Musculoskeletal: Positive for arthralgias, gait problem and joint swelling.       Vitals:    09/25/19 1558   BP: 132/84   Pulse: 81   Resp: 16   Temp: 97.4 °F (36.3 °C)   TempSrc: Temporal   SpO2: 97%   Weight: 103 kg (226 lb 2 oz)   Height: 157.5 cm (62.01\")     Patient's Body mass index is 41.35 kg/m². BMI is above normal parameters. Recommendations include: exercise counseling, nutrition counseling and pharmacological intervention.      Objective   Physical Exam   Constitutional: She is oriented to person, place, and time. Vital signs are normal. She appears well-developed and well-nourished. She is active.  Non-toxic appearance. She does not appear ill. No distress. She is morbidly obese.  HENT:   Head: Normocephalic and atraumatic. Hair is normal.   Right Ear: Hearing and external ear normal.   Left Ear: Hearing and external ear normal.   Nose: Nose normal.   Mouth/Throat: Mucous membranes are not dry.   Eyes: EOM and lids are normal. Pupils are equal, round, and reactive to light. No scleral icterus.   Neck: Trachea normal and phonation normal. Neck supple.   Cardiovascular: Normal rate, regular rhythm and normal heart sounds.   No murmur heard.  Pulmonary/Chest: Effort normal and breath sounds normal. No stridor.   Musculoskeletal: She exhibits no edema or deformity.        Right hand: " She exhibits no deformity.        Left hand: She exhibits no deformity.   Neurological: She is alert and oriented to person, place, and time. She displays no tremor. No cranial nerve deficit. Gait normal.   Skin: Skin is warm and dry. No rash noted. She is not diaphoretic. No cyanosis. Nails show no clubbing.   Psychiatric: She has a normal mood and affect. Her speech is normal and behavior is normal. Judgment and thought content normal. Cognition and memory are normal.   Nursing note and vitals reviewed.      Lab Results   Component Value Date    HGBA1C 8.6 09/25/2019    HGBA1C 9.0 02/08/2019    HGBA1C 10.8 10/05/2018         Assessment/Plan     Problem List Items Addressed This Visit        Digestive    Vitamin D deficiency    Relevant Orders    Vitamin D 25 Hydroxy (Completed)       Endocrine    Insulin dependent type 2 diabetes mellitus, uncontrolled (CMS/HCC) - Primary    Overview     · Failed metformin xr due to GI side effects  · Failed ozempic, Trulicity due to abdominal pain (severe)         Relevant Medications    Continuous Blood Gluc Sensor (FREESTYLE TROY SENSOR SYSTEM)    linagliptin (TRADJENTA) 5 MG tablet tablet    Other Relevant Orders    POC Glycosylated Hemoglobin (Hb A1C) (Completed)    POCT microalbumin (Completed)    Comprehensive Metabolic Panel (Completed)    Acquired hypothyroidism    Relevant Orders    TSH+Free T4 (Completed)    CBC & Differential (Completed)      Other Visit Diagnoses     Need for hepatitis A immunization        Relevant Orders    Hepatitis A Vaccine Adult IM (Completed)    Hyperglycemia        Relevant Medications    Continuous Blood Gluc Sensor (FREESTYLE TROY SENSOR SYSTEM)    linagliptin (TRADJENTA) 5 MG tablet tablet    Polyarthralgia        Relevant Orders    Vitamin B12 (Completed)    Cyclic Citrul Peptide Antibody, IgG / IgA (Completed)    C-reactive Protein (Completed)    Sedimentation Rate (Completed)    JIGAR by IFA, Reflex 9-biomarkers profile (Completed)     CBC & Differential (Completed)    Fatigue, unspecified type        Relevant Orders    Vitamin B12 (Completed)    Folate (Completed)    Cyclic Citrul Peptide Antibody, IgG / IgA (Completed)    C-reactive Protein (Completed)    Sedimentation Rate (Completed)    JIGAR by IFA, Reflex 9-biomarkers profile (Completed)    Magnesium (Completed)    Comprehensive Metabolic Panel (Completed)    TSH+Free T4 (Completed)    Vitamin D 25 Hydroxy (Completed)    CBC & Differential (Completed)          ·     Return in about 3 months (around 1/6/2020) for Diabetes follow up or sooner if needed.    Fior Ramirez MD

## 2019-09-27 DIAGNOSIS — E55.9 VITAMIN D DEFICIENCY: Primary | ICD-10-CM

## 2019-09-27 RX ORDER — CHOLECALCIFEROL (VITAMIN D3) 1250 MCG
50000 CAPSULE ORAL
Qty: 12 CAPSULE | Refills: 0 | Status: SHIPPED | OUTPATIENT
Start: 2019-09-27 | End: 2020-02-10

## 2019-09-30 LAB
25(OH)D3+25(OH)D2 SERPL-MCNC: 17.8 NG/ML (ref 30–100)
ALBUMIN SERPL-MCNC: 4.3 G/DL (ref 3.5–5.5)
ALBUMIN/GLOB SERPL: 1.5 {RATIO} (ref 1.2–2.2)
ALP SERPL-CCNC: 86 IU/L (ref 39–117)
ALT SERPL-CCNC: 24 IU/L (ref 0–32)
ANA SPECKLED TITR SER: NORMAL {TITER}
ANA TITR SER IF: POSITIVE {TITER}
AST SERPL-CCNC: 20 IU/L (ref 0–40)
BASOPHILS # BLD AUTO: 0.1 X10E3/UL (ref 0–0.2)
BASOPHILS NFR BLD AUTO: 1 %
BILIRUB SERPL-MCNC: <0.2 MG/DL (ref 0–1.2)
BUN SERPL-MCNC: 10 MG/DL (ref 6–24)
BUN/CREAT SERPL: 12 (ref 9–23)
CALCIUM SERPL-MCNC: 9.3 MG/DL (ref 8.7–10.2)
CCP IGA+IGG SERPL IA-ACNC: 6 UNITS (ref 0–19)
CENTROMERE B AB SER-ACNC: <0.2 AI (ref 0–0.9)
CHLORIDE SERPL-SCNC: 103 MMOL/L (ref 96–106)
CHROMATIN AB SERPL-ACNC: <0.2 AI (ref 0–0.9)
CO2 SERPL-SCNC: 21 MMOL/L (ref 20–29)
CREAT SERPL-MCNC: 0.85 MG/DL (ref 0.57–1)
CRP SERPL-MCNC: 4 MG/L (ref 0–10)
DSDNA AB SER-ACNC: <1 IU/ML (ref 0–9)
ENA JO1 AB SER-ACNC: <0.2 AI (ref 0–0.9)
ENA RNP AB SER-ACNC: <0.2 AI (ref 0–0.9)
ENA SCL70 AB SER-ACNC: <0.2 AI (ref 0–0.9)
ENA SM AB SER-ACNC: <0.2 AI (ref 0–0.9)
ENA SS-A AB SER-ACNC: <0.2 AI (ref 0–0.9)
ENA SS-B AB SER-ACNC: <0.2 AI (ref 0–0.9)
EOSINOPHIL # BLD AUTO: 0.5 X10E3/UL (ref 0–0.4)
EOSINOPHIL NFR BLD AUTO: 7 %
ERYTHROCYTE [DISTWIDTH] IN BLOOD BY AUTOMATED COUNT: 14 % (ref 12.3–15.4)
ERYTHROCYTE [SEDIMENTATION RATE] IN BLOOD BY WESTERGREN METHOD: 7 MM/HR (ref 0–32)
FOLATE SERPL-MCNC: 11.2 NG/ML
GLOBULIN SER CALC-MCNC: 2.8 G/DL (ref 1.5–4.5)
GLUCOSE SERPL-MCNC: 227 MG/DL (ref 65–99)
HCT VFR BLD AUTO: 42.3 % (ref 34–46.6)
HGB BLD-MCNC: 13.4 G/DL (ref 11.1–15.9)
IMM GRANULOCYTES # BLD AUTO: 0 X10E3/UL (ref 0–0.1)
IMM GRANULOCYTES NFR BLD AUTO: 0 %
LABORATORY COMMENT REPORT: ABNORMAL
LYMPHOCYTES # BLD AUTO: 2.5 X10E3/UL (ref 0.7–3.1)
LYMPHOCYTES NFR BLD AUTO: 35 %
Lab: NORMAL
Lab: NORMAL
MAGNESIUM SERPL-MCNC: 2.1 MG/DL (ref 1.6–2.3)
MCH RBC QN AUTO: 27 PG (ref 26.6–33)
MCHC RBC AUTO-ENTMCNC: 31.7 G/DL (ref 31.5–35.7)
MCV RBC AUTO: 85 FL (ref 79–97)
MONOCYTES # BLD AUTO: 0.5 X10E3/UL (ref 0.1–0.9)
MONOCYTES NFR BLD AUTO: 7 %
NEUTROPHILS # BLD AUTO: 3.6 X10E3/UL (ref 1.4–7)
NEUTROPHILS NFR BLD AUTO: 50 %
PLATELET # BLD AUTO: 222 X10E3/UL (ref 150–450)
POTASSIUM SERPL-SCNC: 4.4 MMOL/L (ref 3.5–5.2)
PROT SERPL-MCNC: 7.1 G/DL (ref 6–8.5)
RBC # BLD AUTO: 4.97 X10E6/UL (ref 3.77–5.28)
SODIUM SERPL-SCNC: 140 MMOL/L (ref 134–144)
T4 FREE SERPL-MCNC: 0.74 NG/DL (ref 0.82–1.77)
TSH SERPL DL<=0.005 MIU/L-ACNC: 36.21 UIU/ML (ref 0.45–4.5)
VIT B12 SERPL-MCNC: 491 PG/ML (ref 232–1245)
WBC # BLD AUTO: 7.3 X10E3/UL (ref 3.4–10.8)

## 2020-02-10 ENCOUNTER — OFFICE VISIT (OUTPATIENT)
Dept: INTERNAL MEDICINE | Facility: CLINIC | Age: 45
End: 2020-02-10

## 2020-02-10 VITALS
DIASTOLIC BLOOD PRESSURE: 70 MMHG | OXYGEN SATURATION: 97 % | TEMPERATURE: 97.8 F | HEIGHT: 62 IN | BODY MASS INDEX: 41.81 KG/M2 | SYSTOLIC BLOOD PRESSURE: 126 MMHG | WEIGHT: 227.2 LBS | HEART RATE: 61 BPM

## 2020-02-10 DIAGNOSIS — R21 RASH AND NONSPECIFIC SKIN ERUPTION: Primary | ICD-10-CM

## 2020-02-10 PROCEDURE — 99213 OFFICE O/P EST LOW 20 MIN: CPT | Performed by: FAMILY MEDICINE

## 2020-02-10 PROCEDURE — 96372 THER/PROPH/DIAG INJ SC/IM: CPT | Performed by: FAMILY MEDICINE

## 2020-02-10 RX ORDER — METHYLPREDNISOLONE 4 MG/1
TABLET ORAL
Qty: 21 EACH | Refills: 0 | Status: SHIPPED | OUTPATIENT
Start: 2020-02-10 | End: 2020-06-17

## 2020-02-10 RX ORDER — CEPHALEXIN 500 MG/1
500 CAPSULE ORAL 3 TIMES DAILY
Qty: 30 CAPSULE | Refills: 0 | Status: SHIPPED | OUTPATIENT
Start: 2020-02-10 | End: 2020-06-17

## 2020-02-10 RX ORDER — METHYLPREDNISOLONE SODIUM SUCCINATE 125 MG/2ML
125 INJECTION, POWDER, LYOPHILIZED, FOR SOLUTION INTRAMUSCULAR; INTRAVENOUS ONCE
Status: COMPLETED | OUTPATIENT
Start: 2020-02-10 | End: 2020-02-10

## 2020-02-10 RX ADMIN — METHYLPREDNISOLONE SODIUM SUCCINATE 125 MG: 125 INJECTION, POWDER, LYOPHILIZED, FOR SOLUTION INTRAMUSCULAR; INTRAVENOUS at 14:14

## 2020-02-10 NOTE — PROGRESS NOTES
Rox Garner is a 44 y.o. female.    Chief Complaint   Patient presents with   • Rash     x 1 week        HPI   Patient reports 1 week ago she developed a rash after wearing new scrubs.   She reports she took benadryl and did notice some improvement.   She has been applying Vaseline, antibiotic ointment and benadryl cream.   She reports the rash is worse on neck.  It burns, itches, and hurts.  She reports rash to arms , torso, hands, and is starting to spread to legs.   She took a 80mg steroid injection without any  Response.  She reports it got worse after trying the scrubs on again a few days ago.        The following portions of the patient's history were reviewed and updated as appropriate: allergies, current medications, past family history, past medical history, past social history, past surgical history and problem list.     Allergies   Allergen Reactions   • Ozempic (0.25 Or 0.5 Mg-Dose) [Semaglutide(0.25 Or 0.5mg-Dos)] Nausea And Vomiting     Abdominal pain severe   • Sulfa Antibiotics    • Trulicity [Dulaglutide] Nausea And Vomiting     Severe abdominal pain         Current Outpatient Medications:   •  cholecalciferol (VITAMIN D3) 1000 UNITS tablet, Take 1,000 Units by mouth Daily., Disp: , Rfl:   •  citalopram (CeleXA) 20 MG tablet, Take 1 tablet by mouth Daily., Disp: 30 tablet, Rfl: 5  •  Continuous Blood Gluc Sensor (FREESTYLE TROY SENSOR SYSTEM), 1 each Every 14 (Fourteen) Days., Disp: 2 each, Rfl: 6  •  Empagliflozin 25 MG tablet, Take 25 mg by mouth Daily., Disp: 90 tablet, Rfl: 4  •  Fluticasone Furoate-Vilanterol (BREO ELLIPTA) 100-25 MCG/INH aerosol powder , Inhale 1 puff Daily., Disp: , Rfl:   •  glucose blood test strip, Check sugars fasting and 2 hours after meals., Disp: 200 each, Rfl: 12  •  hydrOXYzine (ATARAX) 10 MG tablet, Take 1 tablet by mouth 3 (Three) Times a Day As Needed for Itching or Allergies., Disp: 270 tablet, Rfl: 4  •  Insulin Glargine (TOUJEO SOLOSTAR) 300  "UNIT/ML solution pen-injector, Inject 40 Units under the skin into the appropriate area as directed Every Night., Disp: 12 mL, Rfl: 4  •  lansoprazole (PREVACID) 15 MG capsule, Take 15 mg by mouth Daily., Disp: , Rfl:   •  levothyroxine (SYNTHROID, LEVOTHROID) 200 MCG tablet, Take 1 tablet by mouth Daily., Disp: 90 tablet, Rfl: 3  •  levothyroxine (SYNTHROID, LEVOTHROID) 25 MCG tablet, Take 1 tablet by mouth Daily. With 200 mcg to total 225 mcg, Disp: 90 tablet, Rfl: 3  •  linagliptin (TRADJENTA) 5 MG tablet tablet, Take 1 tablet by mouth Daily., Disp: 90 tablet, Rfl: 3  •  ondansetron ODT (ZOFRAN ODT) 8 MG disintegrating tablet, Take 1 tablet by mouth Every 8 (Eight) Hours As Needed for Nausea or Vomiting., Disp: 30 tablet, Rfl: 3  •  cephalexin (KEFLEX) 500 MG capsule, Take 1 capsule by mouth 3 (Three) Times a Day., Disp: 30 capsule, Rfl: 0  •  methylPREDNISolone (MEDROL, STEWART,) 4 MG tablet, Take as directed on package instructions., Disp: 21 each, Rfl: 0  No current facility-administered medications for this visit.     ROS    Review of Systems   Constitutional: Negative for chills and fever.   Respiratory: Positive for shortness of breath.    Cardiovascular: Negative for chest pain.   Gastrointestinal: Negative for abdominal pain, constipation, diarrhea, nausea and vomiting.   Skin: Positive for rash.       Vitals:    02/10/20 1342   BP: 126/70   BP Location: Left arm   Patient Position: Sitting   Cuff Size: Adult   Pulse: 61   Temp: 97.8 °F (36.6 °C)   TempSrc: Temporal   SpO2: 97%   Weight: 103 kg (227 lb 3.2 oz)   Height: 157.5 cm (62.01\")     Body mass index is 41.54 kg/m².    Physical Exam     Physical Exam   Constitutional: She is oriented to person, place, and time. She appears well-developed and well-nourished. No distress.   HENT:   Head: Normocephalic and atraumatic.   Right Ear: External ear normal.   Left Ear: External ear normal.   Eyes: Conjunctivae and EOM are normal.   Cardiovascular: Normal rate " and regular rhythm.   No murmur heard.  Pulmonary/Chest: Effort normal and breath sounds normal. No respiratory distress. She has no wheezes.   Abdominal: Soft. Bowel sounds are normal. She exhibits no distension. There is no tenderness.   Neurological: She is alert and oriented to person, place, and time. No cranial nerve deficit.   Skin: Skin is warm and dry. Rash (Scalded appearance to the patient's upper chest and V-shaped formation.  She does have scattered erythematous, papular lesions to her torso, upper extremities.  Skin on hands is peeling as well.) noted.   Psychiatric: She has a normal mood and affect. Her behavior is normal.       Assessment/Plan    Problem List Items Addressed This Visit        Musculoskeletal and Integument    Rash and nonspecific skin eruption - Primary     Likely secondary to contact with allergen.  Patient is receiving a steroid injection in office.  She is to start a steroid pack tomorrow at home.  There is concern for bacterial etiology as well given her scalded appearance to her upper chest and peeling of the hands.  Will cover with Keflex.  May use over-the-counter topical treatments for itching.  May continue using Benadryl as well.         Relevant Medications    methylPREDNISolone sodium succinate (SOLU-Medrol) injection 125 mg (Completed)          New Medications Ordered This Visit   Medications   • methylPREDNISolone (MEDROL, STEWART,) 4 MG tablet     Sig: Take as directed on package instructions.     Dispense:  21 each     Refill:  0   • cephalexin (KEFLEX) 500 MG capsule     Sig: Take 1 capsule by mouth 3 (Three) Times a Day.     Dispense:  30 capsule     Refill:  0   • methylPREDNISolone sodium succinate (SOLU-Medrol) injection 125 mg       No orders of the defined types were placed in this encounter.      Return if symptoms worsen or fail to improve.    Sarah Livingston DO

## 2020-02-10 NOTE — ASSESSMENT & PLAN NOTE
Likely secondary to contact with allergen.  Patient is receiving a steroid injection in office.  She is to start a steroid pack tomorrow at home.  There is concern for bacterial etiology as well given her scalded appearance to her upper chest and peeling of the hands.  Will cover with Keflex.  May use over-the-counter topical treatments for itching.  May continue using Benadryl as well.

## 2020-03-19 DIAGNOSIS — IMO0002 INSULIN DEPENDENT TYPE 2 DIABETES MELLITUS, UNCONTROLLED: ICD-10-CM

## 2020-05-07 ENCOUNTER — APPOINTMENT (OUTPATIENT)
Dept: CT IMAGING | Facility: HOSPITAL | Age: 45
End: 2020-05-07

## 2020-05-07 ENCOUNTER — HOSPITAL ENCOUNTER (EMERGENCY)
Facility: HOSPITAL | Age: 45
Discharge: HOME | End: 2020-05-07
Attending: EMERGENCY MEDICINE | Admitting: EMERGENCY MEDICINE

## 2020-05-07 VITALS
DIASTOLIC BLOOD PRESSURE: 81 MMHG | TEMPERATURE: 97.3 F | BODY MASS INDEX: 43.54 KG/M2 | WEIGHT: 236.6 LBS | SYSTOLIC BLOOD PRESSURE: 130 MMHG | HEART RATE: 63 BPM | RESPIRATION RATE: 18 BRPM | HEIGHT: 62 IN | OXYGEN SATURATION: 94 %

## 2020-05-07 DIAGNOSIS — R10.12 LEFT UPPER QUADRANT PAIN: Primary | ICD-10-CM

## 2020-05-07 DIAGNOSIS — Z86.39 HISTORY OF DIABETES MELLITUS, TYPE II: ICD-10-CM

## 2020-05-07 LAB
ALBUMIN SERPL-MCNC: 4 G/DL (ref 3.5–5.2)
ALBUMIN/GLOB SERPL: 1.3 G/DL
ALP SERPL-CCNC: 90 U/L (ref 39–117)
ALT SERPL W P-5'-P-CCNC: 19 U/L (ref 1–33)
ANION GAP SERPL CALCULATED.3IONS-SCNC: 12.2 MMOL/L (ref 5–15)
AST SERPL-CCNC: 15 U/L (ref 1–32)
BASOPHILS # BLD AUTO: 0.09 10*3/MM3 (ref 0–0.2)
BASOPHILS NFR BLD AUTO: 1 % (ref 0–1.5)
BILIRUB SERPL-MCNC: 0.3 MG/DL (ref 0.2–1.2)
BILIRUB UR QL STRIP: NEGATIVE
BUN BLD-MCNC: 7 MG/DL (ref 6–20)
BUN/CREAT SERPL: 10.3 (ref 7–25)
CALCIUM SPEC-SCNC: 8.9 MG/DL (ref 8.6–10.5)
CHLORIDE SERPL-SCNC: 101 MMOL/L (ref 98–107)
CLARITY UR: CLEAR
CO2 SERPL-SCNC: 25.8 MMOL/L (ref 22–29)
COLOR UR: YELLOW
CREAT BLD-MCNC: 0.68 MG/DL (ref 0.57–1)
DEPRECATED RDW RBC AUTO: 43.5 FL (ref 37–54)
EOSINOPHIL # BLD AUTO: 0.44 10*3/MM3 (ref 0–0.4)
EOSINOPHIL NFR BLD AUTO: 4.8 % (ref 0.3–6.2)
ERYTHROCYTE [DISTWIDTH] IN BLOOD BY AUTOMATED COUNT: 13.8 % (ref 12.3–15.4)
GFR SERPL CREATININE-BSD FRML MDRD: 93 ML/MIN/1.73
GLOBULIN UR ELPH-MCNC: 3.2 GM/DL
GLUCOSE BLD-MCNC: 199 MG/DL (ref 65–99)
GLUCOSE UR STRIP-MCNC: ABNORMAL MG/DL
HCT VFR BLD AUTO: 41.8 % (ref 34–46.6)
HGB BLD-MCNC: 13.5 G/DL (ref 12–15.9)
HGB UR QL STRIP.AUTO: NEGATIVE
HOLD SPECIMEN: NORMAL
HOLD SPECIMEN: NORMAL
IMM GRANULOCYTES # BLD AUTO: 0.03 10*3/MM3 (ref 0–0.05)
IMM GRANULOCYTES NFR BLD AUTO: 0.3 % (ref 0–0.5)
KETONES UR QL STRIP: NEGATIVE
LEUKOCYTE ESTERASE UR QL STRIP.AUTO: NEGATIVE
LIPASE SERPL-CCNC: 26 U/L (ref 13–60)
LYMPHOCYTES # BLD AUTO: 3.21 10*3/MM3 (ref 0.7–3.1)
LYMPHOCYTES NFR BLD AUTO: 35.2 % (ref 19.6–45.3)
MCH RBC QN AUTO: 27.8 PG (ref 26.6–33)
MCHC RBC AUTO-ENTMCNC: 32.3 G/DL (ref 31.5–35.7)
MCV RBC AUTO: 86.2 FL (ref 79–97)
MONOCYTES # BLD AUTO: 0.61 10*3/MM3 (ref 0.1–0.9)
MONOCYTES NFR BLD AUTO: 6.7 % (ref 5–12)
NEUTROPHILS # BLD AUTO: 4.73 10*3/MM3 (ref 1.7–7)
NEUTROPHILS NFR BLD AUTO: 52 % (ref 42.7–76)
NITRITE UR QL STRIP: NEGATIVE
NRBC BLD AUTO-RTO: 0 /100 WBC (ref 0–0.2)
PH UR STRIP.AUTO: 6.5 [PH] (ref 5–8)
PLATELET # BLD AUTO: 257 10*3/MM3 (ref 140–450)
PMV BLD AUTO: 10.6 FL (ref 6–12)
POTASSIUM BLD-SCNC: 4 MMOL/L (ref 3.5–5.2)
PROT SERPL-MCNC: 7.2 G/DL (ref 6–8.5)
PROT UR QL STRIP: NEGATIVE
RBC # BLD AUTO: 4.85 10*6/MM3 (ref 3.77–5.28)
SODIUM BLD-SCNC: 139 MMOL/L (ref 136–145)
SP GR UR STRIP: >=1.03 (ref 1–1.03)
TROPONIN T SERPL-MCNC: <0.01 NG/ML (ref 0–0.03)
UROBILINOGEN UR QL STRIP: ABNORMAL
WBC NRBC COR # BLD: 9.11 10*3/MM3 (ref 3.4–10.8)
WHOLE BLOOD HOLD SPECIMEN: NORMAL
WHOLE BLOOD HOLD SPECIMEN: NORMAL

## 2020-05-07 PROCEDURE — 25010000002 ONDANSETRON PER 1 MG: Performed by: EMERGENCY MEDICINE

## 2020-05-07 PROCEDURE — 96375 TX/PRO/DX INJ NEW DRUG ADDON: CPT

## 2020-05-07 PROCEDURE — 81003 URINALYSIS AUTO W/O SCOPE: CPT | Performed by: EMERGENCY MEDICINE

## 2020-05-07 PROCEDURE — 83690 ASSAY OF LIPASE: CPT | Performed by: EMERGENCY MEDICINE

## 2020-05-07 PROCEDURE — 84484 ASSAY OF TROPONIN QUANT: CPT | Performed by: EMERGENCY MEDICINE

## 2020-05-07 PROCEDURE — 93005 ELECTROCARDIOGRAM TRACING: CPT | Performed by: EMERGENCY MEDICINE

## 2020-05-07 PROCEDURE — 96374 THER/PROPH/DIAG INJ IV PUSH: CPT

## 2020-05-07 PROCEDURE — 85025 COMPLETE CBC W/AUTO DIFF WBC: CPT | Performed by: EMERGENCY MEDICINE

## 2020-05-07 PROCEDURE — 80053 COMPREHEN METABOLIC PANEL: CPT | Performed by: EMERGENCY MEDICINE

## 2020-05-07 PROCEDURE — 25010000002 KETOROLAC TROMETHAMINE PER 15 MG: Performed by: EMERGENCY MEDICINE

## 2020-05-07 PROCEDURE — 99284 EMERGENCY DEPT VISIT MOD MDM: CPT

## 2020-05-07 PROCEDURE — 74177 CT ABD & PELVIS W/CONTRAST: CPT

## 2020-05-07 PROCEDURE — 25010000002 IOPAMIDOL 61 % SOLUTION: Performed by: EMERGENCY MEDICINE

## 2020-05-07 RX ORDER — ONDANSETRON 2 MG/ML
4 INJECTION INTRAMUSCULAR; INTRAVENOUS ONCE
Status: COMPLETED | OUTPATIENT
Start: 2020-05-07 | End: 2020-05-07

## 2020-05-07 RX ORDER — ONDANSETRON 4 MG/1
4 TABLET, ORALLY DISINTEGRATING ORAL EVERY 8 HOURS PRN
Qty: 12 TABLET | Refills: 0 | Status: SHIPPED | OUTPATIENT
Start: 2020-05-07 | End: 2020-05-07 | Stop reason: SDUPTHER

## 2020-05-07 RX ORDER — KETOROLAC TROMETHAMINE 30 MG/ML
30 INJECTION, SOLUTION INTRAMUSCULAR; INTRAVENOUS ONCE
Status: COMPLETED | OUTPATIENT
Start: 2020-05-07 | End: 2020-05-07

## 2020-05-07 RX ORDER — SODIUM CHLORIDE 0.9 % (FLUSH) 0.9 %
10 SYRINGE (ML) INJECTION AS NEEDED
Status: DISCONTINUED | OUTPATIENT
Start: 2020-05-07 | End: 2020-05-07 | Stop reason: HOSPADM

## 2020-05-07 RX ORDER — SUCRALFATE 1 G/1
1 TABLET ORAL 4 TIMES DAILY
Qty: 30 TABLET | Refills: 0 | Status: SHIPPED | OUTPATIENT
Start: 2020-05-07 | End: 2021-06-30

## 2020-05-07 RX ORDER — ONDANSETRON 4 MG/1
4 TABLET, ORALLY DISINTEGRATING ORAL EVERY 8 HOURS PRN
Qty: 12 TABLET | Refills: 0 | Status: SHIPPED | OUTPATIENT
Start: 2020-05-07 | End: 2020-06-17

## 2020-05-07 RX ORDER — DICYCLOMINE HCL 20 MG
20 TABLET ORAL EVERY 6 HOURS PRN
Qty: 20 TABLET | Refills: 0 | Status: SHIPPED | OUTPATIENT
Start: 2020-05-07 | End: 2020-05-07 | Stop reason: SDUPTHER

## 2020-05-07 RX ORDER — DICYCLOMINE HCL 20 MG
20 TABLET ORAL EVERY 6 HOURS PRN
Qty: 20 TABLET | Refills: 0 | Status: SHIPPED | OUTPATIENT
Start: 2020-05-07 | End: 2021-06-30

## 2020-05-07 RX ADMIN — ONDANSETRON 4 MG: 2 INJECTION INTRAMUSCULAR; INTRAVENOUS at 03:38

## 2020-05-07 RX ADMIN — IOPAMIDOL 100 ML: 612 INJECTION, SOLUTION INTRAVENOUS at 04:58

## 2020-05-07 RX ADMIN — LIDOCAINE HYDROCHLORIDE: 20 SOLUTION ORAL; TOPICAL at 03:34

## 2020-05-07 RX ADMIN — KETOROLAC TROMETHAMINE 30 MG: 30 INJECTION, SOLUTION INTRAMUSCULAR at 03:34

## 2020-05-07 NOTE — ED PROVIDER NOTES
Subjective  History of Present Illness:    Chief Complaint: Left upper quadrant abdominal pain  History of Present Illness: 44-year-old female with a history of diabetes, remote cholecystectomy, here with left upper quadrant abdominal pain for 2 days.  Also history of GERD on PPI no fever no GI bleeding  Onset: 2 days  Duration: Persistent  Exacerbating / Alleviating factors: None  Associated symptoms: Nausea  Character: Intermittent sharp pain     Nurses Notes reviewed and agree, including vitals, allergies, social history and prior medical history.     REVIEW OF SYSTEMS: All systems reviewed and not pertinent unless noted.    Positive for: Left upper quadrant abdominal pain, nausea    Negative for: Fever GI bleeding urinary symptoms flank pain cough shortness of breath fever    Past Medical History:   Diagnosis Date   • Depression    • Diabetes mellitus (CMS/[facility])     TYPE II   • Obesity    • Vitamin D deficiency        Allergies:  Ozempic (0.25 or 0.5 mg-dose) [semaglutide(0.25 or 0.5mg-dos)]; Sulfa antibiotics; and Trulicity [dulaglutide]      Past Surgical History:   Procedure Laterality Date   •  SECTION      X2   • CHOLECYSTECTOMY     • TUBAL ABDOMINAL LIGATION           Social History     Socioeconomic History   • Marital status:      Spouse name: Not on file   • Number of children: Not on file   • Years of education: Not on file   • Highest education level: Not on file   Tobacco Use   • Smoking status: Never Smoker   • Smokeless tobacco: Never Used   Substance and Sexual Activity   • Alcohol use: No   • Drug use: No         Family History   Problem Relation Age of Onset   • Diabetes Mother    • Hypertension Mother    • Hyperlipidemia Mother    • Thyroid disease Mother    • Diabetes Father    • Hypertension Father    • Mental illness Father    • Stroke Father    • Kidney disease Sister    • Migraines Sister    • Breast cancer Neg Hx    • Ovarian cancer Neg Hx        Objective  Physical  Exam:      GENERAL APPEARANCE: Well developed, well nourished, uncomfortable obese  VITAL SIGNS: per nursing, reviewed and noted  SKIN: no rashes, ulcerations or petechiae.  Head: Normocephalic, atraumatic.   EYES: perrla. EOMI.  ENT:  TM clear  LUNGS:  normal breath sounds. No retractions.   CARDIOVASCULAR:  regular rate and rhythm, no murmurs.  Good Peripheral pulses.  ABDOMEN: Soft, mild left upper quadrant tenderness palpation.  No rebound tenderness or guarding, normal bowel sounds. No hernia. No ascites.  MUSCULOSKELETAL:  No tenderness. Full ROM. Strength and tone normal.  NEUROLOGIC: Alert, oriented x 3. No gross deficits.   NECK: Supple, symmetric. No tenderness, no masses. Full ROM  Back: full rom, no paraspinal spasm. No CVA tenderness.   PSYCH: appropriate affect,   : no bladder tenderness or distention, no CVA tenderness        Procedures  No attending provider procedures were performed  ED Course:  ED Course as of May 10 0713   Thu May 07, 2020   0410 Lipase: 26 [PF]   0410 WBC: 9.11 [PF]   0410 Hemoglobin: 13.5 [PF]   0410 Hematocrit: 41.8 [PF]   0410 Platelets: 257 [PF]   0410 Troponin T: <0.010 [PF]   0410 Glucose(!): 199 [PF]   0410 BUN: 7 [PF]   0410 Creatinine: 0.68 [PF]   0410 Sodium: 139 [PF]   0410 Potassium: 4.0 [PF]   0410 Chloride: 101 [PF]   0410 CO2: 25.8 [PF]   0410 Calcium: 8.9 [PF]   0410 Total Protein: 7.2 [PF]   0410 Albumin: 4.00 [PF]   0410 ALT (SGPT): 19 [PF]   0410 AST (SGOT): 15 [PF]   0410 Total Bilirubin: 0.3 [PF]   0410 Nitrite, UA: Negative [PF]   0410 Glucose(!): >=1000 mg/dL (3+) [PF]   0410 Urobilinogen, UA: 0.2 E.U./dL [PF]   0410 Leukocytes, UA: Negative [PF]   0410 Protein, UA: Negative [PF]   0410 Blood, UA: Negative [PF]   0410 Bilirubin, UA: Negative [PF]   0410 Ketones, UA: Negative [PF]   0427 EKG interpreted by me reveals sinus rhythm rate of 59.  No ectopy no ischemic changes    [PF]   [place] May 10, 2020   0713 FINDINGS:      ABDOMEN: The lung bases are clear.  The heart is normal in size. The  liver is diffusely hypodense consistent with fatty infiltration. The  patient is status post cholecystectomy. The spleen is unremarkable. No  adrenal mass is present.  The pancreas is normal. The kidneys enhance  appropriately. There are bilateral renal cysts. The aorta is normal in  caliber. There is no free fluid or adenopathy. The abdominal portions of  the GI tract are unremarkable with no evidence of obstruction.     PELVIS: The appendix is normal. There are scattered colonic diverticula  without CT evidence of diverticulitis The urinary bladder is  unremarkable. There is no significant free fluid or adenopathy.     IMPRESSION:  No evidence of acute intra-abdominal process.     1418.18 mGy.cm        This study was performed with techniques to keep radiation doses as low  as reasonably achievable (ALARA). Individualized dose reduction  techniques using automated exposure control or adjustment of mA and/or  kV according to the patient size were employed.      This report was finalized on 5/7/2020 7:51 AM by Elizabeth Moseley M.D..          [PF]      ED Course User Index  [PF] Ayden Mancilla, DO       [facility]  44-year-old female history of diabetes obesity and GERD presents with left upper quadrant pain.  Negative work-up including CT scan abdomen pelvis with reassuring vitals examination and labs.  Labs normal only for glucosuria.  Added Bentyl Zofran and Carafate.  Advised outpatient follow-up with GI for possible endoscopy / further studies.  Return precautions were discussed.  Final diagnoses:   Left upper quadrant pain   History of diabetes mellitus, type II          Ayden Mancilla,   05/10/20 0715

## 2020-06-17 ENCOUNTER — OFFICE VISIT (OUTPATIENT)
Dept: INTERNAL MEDICINE | Facility: CLINIC | Age: 45
End: 2020-06-17

## 2020-06-17 VITALS
TEMPERATURE: 96.6 F | SYSTOLIC BLOOD PRESSURE: 120 MMHG | HEIGHT: 62 IN | HEART RATE: 71 BPM | OXYGEN SATURATION: 97 % | RESPIRATION RATE: 18 BRPM | DIASTOLIC BLOOD PRESSURE: 70 MMHG | BODY MASS INDEX: 42.51 KG/M2 | WEIGHT: 231 LBS

## 2020-06-17 DIAGNOSIS — J45.30 MILD PERSISTENT ASTHMA, UNSPECIFIED WHETHER COMPLICATED: ICD-10-CM

## 2020-06-17 DIAGNOSIS — Z11.59 NEED FOR HEPATITIS C SCREENING TEST: ICD-10-CM

## 2020-06-17 DIAGNOSIS — IMO0002 INSULIN DEPENDENT TYPE 2 DIABETES MELLITUS, UNCONTROLLED: Primary | ICD-10-CM

## 2020-06-17 DIAGNOSIS — E03.9 ACQUIRED HYPOTHYROIDISM: ICD-10-CM

## 2020-06-17 DIAGNOSIS — F32.0 MILD SINGLE CURRENT EPISODE OF MAJOR DEPRESSIVE DISORDER (HCC): ICD-10-CM

## 2020-06-17 DIAGNOSIS — E66.01 MORBID OBESITY (HCC): ICD-10-CM

## 2020-06-17 LAB — HBA1C MFR BLD: 9.3 %

## 2020-06-17 PROCEDURE — 99214 OFFICE O/P EST MOD 30 MIN: CPT | Performed by: FAMILY MEDICINE

## 2020-06-17 PROCEDURE — 83036 HEMOGLOBIN GLYCOSYLATED A1C: CPT | Performed by: FAMILY MEDICINE

## 2020-06-17 RX ORDER — CITALOPRAM 20 MG/1
20 TABLET ORAL DAILY
Qty: 90 TABLET | Refills: 3 | Status: SHIPPED | OUTPATIENT
Start: 2020-06-17 | End: 2021-04-14 | Stop reason: SDUPTHER

## 2020-06-17 RX ORDER — LEVOTHYROXINE SODIUM 0.2 MG/1
200 TABLET ORAL DAILY
Qty: 90 TABLET | Refills: 3 | Status: SHIPPED | OUTPATIENT
Start: 2020-06-17 | End: 2021-06-30 | Stop reason: SDUPTHER

## 2020-06-17 RX ORDER — DICYCLOMINE HCL 20 MG
20 TABLET ORAL EVERY 6 HOURS PRN
Qty: 20 TABLET | Refills: 0 | Status: CANCELLED | OUTPATIENT
Start: 2020-06-17

## 2020-06-17 RX ORDER — LEVOTHYROXINE SODIUM 0.03 MG/1
25 TABLET ORAL DAILY
Qty: 90 TABLET | Refills: 3 | Status: SHIPPED | OUTPATIENT
Start: 2020-06-17 | End: 2020-06-18 | Stop reason: SDUPTHER

## 2020-06-17 NOTE — PROGRESS NOTES
Subjective    Rox Garner is a 44 y.o. female here for:  Chief Complaint   Patient presents with   • Diabetes       History per MA reviewed.     Morbid obesity: chronic daily issue that has waxed and waned. Complicated by diabetes mellitus, insulin use, hypothyroidism.    Diabetes mellitus is chronic and uncontrolled. She tried and failed ozempic and Trulicity due to abdominal pain. No history pancreatitis. She has poor compliance with monitoring glucose levels.     Hypothyroidism, acquired, chronic daily issue that has been uncontrolled previous labs. On levothyroxine, pt reports she is taking medicine daily on empty stomach.    Diabetes   She presents for her follow-up diabetic visit. She has type 2 diabetes mellitus. No MedicAlert identification noted. The initial diagnosis of diabetes was made 11 years ago. Associated symptoms include fatigue and weight loss. Pertinent negatives for diabetes include no chest pain. There are no hypoglycemic complications. Pertinent negatives for diabetic complications include no CVA, heart disease or nephropathy. Risk factors for coronary artery disease include obesity, sedentary lifestyle, diabetes mellitus and stress. Current diabetic treatment includes insulin injections and oral agent (monotherapy). She is compliant with treatment most of the time. She is currently taking insulin at bedtime. Insulin injections are given by patient. Rotation sites for injection include the abdominal wall. She is following a generally unhealthy diet. When asked about meal planning, she reported none. She has not had a previous visit with a dietitian. She never participates in exercise. Blood glucose monitoring compliance is inadequate. She does not see a podiatrist.Eye exam is current.   Hypothyroidism   This is a chronic problem. The current episode started more than 1 year ago. The problem occurs daily. Associated symptoms include arthralgias and fatigue. Pertinent negatives  "include no chest pain, coughing, fever, nausea or sore throat. Treatments tried: levothyroxine. The treatment provided significant relief.   Asthma   There is no cough or shortness of breath. The current episode started more than 1 year ago. The problem occurs intermittently. Associated symptoms include weight loss. Pertinent negatives include no chest pain, fever or sore throat. Her symptoms are alleviated by steroid inhaler and beta-agonist. There are no known risk factors for lung disease.   Depression   Visit Type: follow-up  Patient presents with the following symptoms: fatigue and weight loss.  Patient is not experiencing: depressed mood and shortness of breath.  Compliance with medications:  %  Treatment side effects: none appreciated.         The following portions of the patient's history were reviewed and updated as appropriate: allergies, current medications, past family history, past medical history, past social history, past surgical history and problem list.    Review of Systems   Constitutional: Positive for fatigue and unexpected weight loss. Negative for fever.   HENT: Negative for sore throat.    Respiratory: Negative for cough and shortness of breath.    Cardiovascular: Negative for chest pain.   Gastrointestinal: Negative for nausea.   Musculoskeletal: Positive for arthralgias.   Psychiatric/Behavioral: Positive for stress. Negative for depressed mood.       Objective   Visit Vitals  /70   Pulse 71   Temp 96.6 °F (35.9 °C) (Temporal)   Resp 18   Ht 157.5 cm (62.01\")   Wt 105 kg (231 lb)   LMP 04/18/2020   SpO2 97%   BMI 42.24 kg/m²       Physical Exam   Constitutional: She is oriented to person, place, and time. Vital signs are normal. She appears well-developed and well-nourished. She is active.  Non-toxic appearance. She does not have a sickly appearance. She does not appear ill. No distress. Face mask in place. She is morbidly obese.  HENT:   Head: Normocephalic and atraumatic. "   Right Ear: Hearing normal.   Left Ear: Hearing normal.   Eyes: EOM are normal. Right eye exhibits no discharge. Left eye exhibits no discharge. No scleral icterus.   Neck: Phonation normal. Neck supple.   Pulmonary/Chest: Effort normal.   Neurological: She is alert and oriented to person, place, and time. She displays no tremor. No cranial nerve deficit.   Skin: Skin is warm. No rash noted. She is not diaphoretic. No pallor.   Psychiatric: She has a normal mood and affect. Her speech is normal and behavior is normal. Judgment and thought content normal. Cognition and memory are normal.   Nursing note and vitals reviewed.      Lab Results   Component Value Date    HGBA1C 9.3 06/17/2020    HGBA1C 8.6 09/25/2019    HGBA1C 9.0 02/08/2019     Lab Results   Component Value Date    TSH 36.210 (H) 09/25/2019     Lab Results   Component Value Date    FREET4 0.74 (L) 09/25/2019         Assessment/Plan     Problem List Items Addressed This Visit        Respiratory    Mild persistent asthma    Relevant Medications    Fluticasone Furoate-Vilanterol (Breo Ellipta) 100-25 MCG/INH inhaler       Digestive    Morbid obesity (CMS/HCC)    Relevant Orders    TSH+Free T4 (Completed)       Endocrine    Insulin dependent type 2 diabetes mellitus, uncontrolled (CMS/[facility]) - Primary    Overview     · Failed metformin xr due to GI side effects  · Failed ozempic, Trulicity due to abdominal pain (severe)         Relevant Medications    Insulin Pen Needle (BD Pen Needle Rosa M U/F) 32G X 4 MM misc    Insulin Glargine, 2 Unit Dial, (TOUJEO) 300 UNIT/ML solution pen-injector injection    Other Relevant Orders    POC Glycosylated Hemoglobin (Hb A1C) (Completed)    Acquired hypothyroidism    Relevant Medications    levothyroxine (SYNTHROID, LEVOTHROID) 200 MCG tablet    levothyroxine (SYNTHROID, LEVOTHROID) 50 MCG tablet    Other Relevant Orders    TSH+Free T4 (Completed)       Other    Mild single current episode of major depressive disorder  (CMS/HCC)    Relevant Medications    citalopram (CeleXA) 20 MG tablet      Other Visit Diagnoses     Need for hepatitis C screening test        Relevant Orders    Hepatitis C Antibody (Completed)          · Patient's Body mass index is 42.24 kg/m². BMI is above normal parameters. Recommendations include: nutrition counseling and pharmacological intervention.  ·       Fior Ramirez MD

## 2020-06-18 LAB
HCV AB S/CO SERPL IA: <0.1 S/CO RATIO (ref 0–0.9)
T4 FREE SERPL-MCNC: 0.99 NG/DL (ref 0.93–1.7)
TSH SERPL DL<=0.005 MIU/L-ACNC: 6.89 UIU/ML (ref 0.27–4.2)

## 2020-06-18 RX ORDER — LEVOTHYROXINE SODIUM 0.05 MG/1
50 TABLET ORAL DAILY
Qty: 90 TABLET | Refills: 3 | Status: SHIPPED | OUTPATIENT
Start: 2020-06-18 | End: 2021-06-30

## 2020-06-22 PROBLEM — J45.30 MILD PERSISTENT ASTHMA: Status: ACTIVE | Noted: 2020-06-22

## 2021-04-02 ENCOUNTER — PRIOR AUTHORIZATION (OUTPATIENT)
Dept: INTERNAL MEDICINE | Facility: CLINIC | Age: 46
End: 2021-04-02

## 2021-04-04 DIAGNOSIS — IMO0002 INSULIN DEPENDENT TYPE 2 DIABETES MELLITUS, UNCONTROLLED: ICD-10-CM

## 2021-04-05 RX ORDER — EMPAGLIFLOZIN 25 MG/1
TABLET, FILM COATED ORAL
Qty: 90 TABLET | Refills: 0 | Status: SHIPPED | OUTPATIENT
Start: 2021-04-05 | End: 2022-01-25 | Stop reason: SDUPTHER

## 2021-04-14 DIAGNOSIS — F32.0 MILD SINGLE CURRENT EPISODE OF MAJOR DEPRESSIVE DISORDER (HCC): ICD-10-CM

## 2021-04-14 DIAGNOSIS — R73.9 HYPERGLYCEMIA: ICD-10-CM

## 2021-04-14 DIAGNOSIS — IMO0002 INSULIN DEPENDENT TYPE 2 DIABETES MELLITUS, UNCONTROLLED: ICD-10-CM

## 2021-04-14 RX ORDER — PEN NEEDLE, DIABETIC 32GX 5/32"
1 NEEDLE, DISPOSABLE MISCELLANEOUS NIGHTLY
Qty: 100 EACH | Refills: 0 | Status: SHIPPED | OUTPATIENT
Start: 2021-04-14

## 2021-04-14 RX ORDER — HYDROXYZINE HYDROCHLORIDE 10 MG/1
10 TABLET, FILM COATED ORAL 3 TIMES DAILY PRN
Qty: 270 TABLET | Refills: 0 | Status: SHIPPED | OUTPATIENT
Start: 2021-04-14

## 2021-04-14 RX ORDER — CITALOPRAM 20 MG/1
20 TABLET ORAL DAILY
Qty: 90 TABLET | Refills: 0 | Status: SHIPPED | OUTPATIENT
Start: 2021-04-14 | End: 2022-01-25 | Stop reason: SDUPTHER

## 2021-06-16 DIAGNOSIS — IMO0002 INSULIN DEPENDENT TYPE 2 DIABETES MELLITUS, UNCONTROLLED: ICD-10-CM

## 2021-06-16 RX ORDER — EMPAGLIFLOZIN 25 MG/1
TABLET, FILM COATED ORAL
Qty: 90 TABLET | Refills: 0 | OUTPATIENT
Start: 2021-06-16

## 2021-06-30 ENCOUNTER — OFFICE VISIT (OUTPATIENT)
Dept: INTERNAL MEDICINE | Facility: CLINIC | Age: 46
End: 2021-06-30

## 2021-06-30 VITALS
BODY MASS INDEX: 43.5 KG/M2 | HEART RATE: 83 BPM | WEIGHT: 236.4 LBS | DIASTOLIC BLOOD PRESSURE: 84 MMHG | OXYGEN SATURATION: 97 % | SYSTOLIC BLOOD PRESSURE: 128 MMHG | RESPIRATION RATE: 16 BRPM | HEIGHT: 62 IN | TEMPERATURE: 97.7 F

## 2021-06-30 DIAGNOSIS — E03.9 ACQUIRED HYPOTHYROIDISM: ICD-10-CM

## 2021-06-30 DIAGNOSIS — IMO0002 INSULIN DEPENDENT TYPE 2 DIABETES MELLITUS, UNCONTROLLED: Primary | ICD-10-CM

## 2021-06-30 DIAGNOSIS — Z12.31 SCREENING MAMMOGRAM, ENCOUNTER FOR: ICD-10-CM

## 2021-06-30 DIAGNOSIS — Z12.11 ENCOUNTER FOR COLORECTAL CANCER SCREENING: ICD-10-CM

## 2021-06-30 DIAGNOSIS — Z12.12 ENCOUNTER FOR COLORECTAL CANCER SCREENING: ICD-10-CM

## 2021-06-30 DIAGNOSIS — Z13.220 SCREENING, LIPID: ICD-10-CM

## 2021-06-30 PROCEDURE — 99214 OFFICE O/P EST MOD 30 MIN: CPT | Performed by: FAMILY MEDICINE

## 2021-06-30 RX ORDER — LEVOTHYROXINE SODIUM 0.2 MG/1
200 TABLET ORAL DAILY
Qty: 90 TABLET | Refills: 0 | Status: SHIPPED | OUTPATIENT
Start: 2021-06-30 | End: 2022-01-27 | Stop reason: DRUGHIGH

## 2021-06-30 RX ORDER — LEVOTHYROXINE SODIUM 0.03 MG/1
25 TABLET ORAL DAILY
Qty: 90 TABLET | Refills: 0 | Status: SHIPPED | OUTPATIENT
Start: 2021-06-30 | End: 2021-07-02 | Stop reason: SDUPTHER

## 2021-06-30 RX ORDER — LEVOTHYROXINE SODIUM 0.03 MG/1
25 TABLET ORAL DAILY
COMMUNITY
End: 2021-06-30 | Stop reason: SDUPTHER

## 2021-06-30 NOTE — PROGRESS NOTES
Rox Garner is a 45 y.o. female.    Chief Complaint   Patient presents with   • Diabetes     pt of dr quigley, has not been seen in over 1 year, needs refills on levothyroxine and tradjenta    • Hypothyroidism       HPI   Patient has had diabetes for the past few years. She has been compliant with the medications and denies any side effects from it. She has not been monitoring fingersticks.  her fingerstick range is between unknown. She states she doesn't have time to check her glucose because she has to be at work at 8am. She has not had hypoglycemic symptoms. She has been following a diabetic diet and has been active.  her last eye exam was greater than a year ago .     Patient has had hypothyroidism for a long time.  Patient is compliant with taking medication without side effects.  Patient admits to change in skin,  nails, or hair, heat intolerance.  Patient denies fatigue, feeling cold and cold intolerance.      She is due for a mammogram and colorectal cancer screening.     The following portions of the patient's history were reviewed and updated as appropriate: allergies, current medications, past family history, past medical history, past social history, past surgical history and problem list.     Allergies   Allergen Reactions   • Ozempic (0.25 Or 0.5 Mg-Dose) [Semaglutide(0.25 Or 0.5mg-Dos)] Nausea And Vomiting     Abdominal pain severe   • Sulfa Antibiotics    • Trulicity [Dulaglutide] Nausea And Vomiting     Severe abdominal pain         Current Outpatient Medications:   •  cholecalciferol (VITAMIN D3) 1000 UNITS tablet, Take 1,000 Units by mouth Daily., Disp: , Rfl:   •  citalopram (CeleXA) 20 MG tablet, Take 1 tablet by mouth Daily., Disp: 90 tablet, Rfl: 0  •  Fluticasone Furoate-Vilanterol (Breo Ellipta) 100-25 MCG/INH inhaler, Inhale 1 puff Daily., Disp: 3 each, Rfl: 3  •  glucose blood test strip, Check sugars fasting and 2 hours after meals., Disp: 200 each, Rfl: 12  •  hydrOXYzine  (ATARAX) 10 MG tablet, Take 1 tablet by mouth 3 (Three) Times a Day As Needed for Itching or Allergies., Disp: 270 tablet, Rfl: 0  •  Insulin Glargine, 2 Unit Dial, (TOUJEO) 300 UNIT/ML solution pen-injector injection, Inject 45 Units under the skin into the appropriate area as directed Every Night., Disp: 13.5 mL, Rfl: 3  •  Insulin Pen Needle (BD Pen Needle Rosa M U/F) 32G X 4 MM misc, 1 Units Every Night., Disp: 100 each, Rfl: 0  •  Jardiance 25 MG tablet, TAKE 1 TABLET BY MOUTH DAILY, Disp: 90 tablet, Rfl: 0  •  lansoprazole (PREVACID) 15 MG capsule, Take 15 mg by mouth Daily., Disp: , Rfl:   •  levothyroxine (SYNTHROID, LEVOTHROID) 200 MCG tablet, Take 1 tablet by mouth Daily. Takes with 25mcg to total 225mcg, Disp: 90 tablet, Rfl: 0  •  levothyroxine (SYNTHROID, LEVOTHROID) 25 MCG tablet, Take 1 tablet by mouth Daily. Takes with 200mcg for total of 225mcg, Disp: 90 tablet, Rfl: 0  •  linagliptin (Tradjenta) 5 MG tablet tablet, Take 1 tablet by mouth Daily., Disp: 90 tablet, Rfl: 0  •  Continuous Blood Gluc  (FreeStyle [place] 2 [place]) device, 1 each Continuous. E11.65, Disp: 1 each, Rfl: 0  •  Continuous Blood Gluc Sensor (FreeStyle [place] 2 Sensor) misc, 1 each Every 14 (Fourteen) Days. E11.65, Disp: 6 each, Rfl: 3    ROS    Review of Systems   Constitutional: Negative for chills, fatigue and fever.   HENT: Negative for congestion and rhinorrhea.    Respiratory: Negative for cough and shortness of breath.    Cardiovascular: Negative for chest pain.   Gastrointestinal: Negative for abdominal pain, constipation, diarrhea, nausea and vomiting.   Endocrine: Positive for heat intolerance. Negative for cold intolerance.   Musculoskeletal: Negative for arthralgias and back pain.   Skin:        Hair loss   Allergic/Immunologic: Positive for environmental allergies.   Neurological: Negative for weakness, numbness and headache.   Psychiatric/Behavioral: Negative for depressed mood. The patient is not  "nervous/anxious.        Vitals:    06/30/21 1130   BP: 128/84   BP Location: Left arm   Patient Position: Sitting   Cuff Size: Adult   Pulse: 83   Resp: 16   Temp: 97.7 °F (36.5 °C)   TempSrc: Temporal   SpO2: 97%   Weight: 107 kg (236 lb 6.4 oz)   Height: 157.5 cm (62\")     Body mass index is 43.24 kg/m².    Physical Exam     Physical Exam  Constitutional:       General: She is not in acute distress.     Appearance: Normal appearance. She is well-developed.   HENT:      Head: Normocephalic and atraumatic.      Right Ear: Tympanic membrane and external ear normal.      Left Ear: Tympanic membrane and external ear normal.   Eyes:      Extraocular Movements: Extraocular movements intact.      Conjunctiva/sclera: Conjunctivae normal.   Neck:      Thyroid: No thyromegaly.   Cardiovascular:      Rate and Rhythm: Normal rate and regular rhythm.      Heart sounds: No murmur heard.     Pulmonary:      Effort: Pulmonary effort is normal. No respiratory distress.      Breath sounds: Normal breath sounds. No wheezing.   Abdominal:      General: Bowel sounds are normal. There is no distension.      Palpations: Abdomen is soft.      Tenderness: There is no abdominal tenderness.   Musculoskeletal:         General: Normal range of motion.      Cervical back: Normal range of motion and neck supple.   Lymphadenopathy:      Cervical: No cervical adenopathy.   Skin:     General: Skin is warm.      Comments: Clammy skin   Neurological:      Mental Status: She is alert and oriented to person, place, and time.      Cranial Nerves: No cranial nerve deficit.   Psychiatric:         Mood and Affect: Mood normal.         Behavior: Behavior normal.         Assessment/Plan    Problems Addressed this Visit        Endocrine and Metabolic    Insulin dependent type 2 diabetes mellitus, uncontrolled (CMS/HCC) - Primary     Uncontrolled per last A1c.  Suspect A1c will remain uncontrolled.  Patient has been highly encouraged to check her glucose " daily.  This is not a task that takes long and should not interfere with her ability to be at work on time.  At this time she will continue Jardiance, Tradjenta, and insulin.  Will obtain updated A1c and microalbumin.  She does not appear to be currently on a statin.  Patient to address with PCP.         Relevant Medications    linagliptin (Tradjenta) 5 MG tablet tablet    Other Relevant Orders    CBC & Differential    Comprehensive Metabolic Panel    Hemoglobin A1c    Lipid Panel    Microalbumin / Creatinine Urine Ratio - Urine, Clean Catch    Acquired hypothyroidism     Status unknown.  Will obtain updated thyroid studies and continue Synthroid.         Relevant Medications    levothyroxine (SYNTHROID, LEVOTHROID) 200 MCG tablet    levothyroxine (SYNTHROID, LEVOTHROID) 25 MCG tablet    Other Relevant Orders    TSH    T4, Free      Other Visit Diagnoses     Encounter for colorectal cancer screening        Relevant Orders    Cologuard - Stool, Per Rectum    Screening mammogram, encounter for        Relevant Orders    Mammo Screening Digital Tomosynthesis Bilateral With CAD    Screening, lipid        Relevant Orders    Lipid Panel          New Medications Ordered This Visit   Medications   • levothyroxine (SYNTHROID, LEVOTHROID) 200 MCG tablet     Sig: Take 1 tablet by mouth Daily. Takes with 25mcg to total 225mcg     Dispense:  90 tablet     Refill:  0   • levothyroxine (SYNTHROID, LEVOTHROID) 25 MCG tablet     Sig: Take 1 tablet by mouth Daily. Takes with 200mcg for total of 225mcg     Dispense:  90 tablet     Refill:  0   • linagliptin (Tradjenta) 5 MG tablet tablet     Sig: Take 1 tablet by mouth Daily.     Dispense:  90 tablet     Refill:  0   • Continuous Blood Gluc Sensor (FreeStyle [place] 2 Sensor) misc     Si each Every 14 (Fourteen) Days. E11.65     Dispense:  6 each     Refill:  3   • Continuous Blood Gluc  (FreeStyle Paola 2 [place]) device     Si each Continuous. E11.65     Dispense:  1  each     Refill:  0       No orders of the defined types were placed in this encounter.      Return in about 3 months (around 9/30/2021) for diabetes, thyroid.    Sarah Livingston, DO

## 2021-06-30 NOTE — ASSESSMENT & PLAN NOTE
Uncontrolled per last A1c.  Suspect A1c will remain uncontrolled.  Patient has been highly encouraged to check her glucose daily.  This is not a task that takes long and should not interfere with her ability to be at work on time.  At this time she will continue Jardiance, Tradjenta, and insulin.  Will obtain updated A1c and microalbumin.  She does not appear to be currently on a statin.  Patient to address with PCP.

## 2021-07-01 ENCOUNTER — PATIENT MESSAGE (OUTPATIENT)
Dept: INTERNAL MEDICINE | Facility: CLINIC | Age: 46
End: 2021-07-01

## 2021-07-01 LAB
ALBUMIN SERPL-MCNC: 4.4 G/DL (ref 3.5–5.2)
ALBUMIN/CREAT UR: 21 MG/G CREAT (ref 0–29)
ALBUMIN/GLOB SERPL: 1.6 G/DL
ALP SERPL-CCNC: 92 U/L (ref 39–117)
ALT SERPL-CCNC: 29 U/L (ref 1–33)
AST SERPL-CCNC: 21 U/L (ref 1–32)
BASOPHILS # BLD AUTO: 0.08 10*3/MM3 (ref 0–0.2)
BASOPHILS NFR BLD AUTO: 1.4 % (ref 0–1.5)
BILIRUB SERPL-MCNC: 0.2 MG/DL (ref 0–1.2)
BUN SERPL-MCNC: 10 MG/DL (ref 6–20)
BUN/CREAT SERPL: 12.7 (ref 7–25)
CALCIUM SERPL-MCNC: 9.3 MG/DL (ref 8.6–10.5)
CHLORIDE SERPL-SCNC: 108 MMOL/L (ref 98–107)
CHOLEST SERPL-MCNC: 141 MG/DL (ref 0–200)
CO2 SERPL-SCNC: 23.2 MMOL/L (ref 22–29)
CREAT SERPL-MCNC: 0.79 MG/DL (ref 0.57–1)
CREAT UR-MCNC: 50.8 MG/DL
EOSINOPHIL # BLD AUTO: 0.41 10*3/MM3 (ref 0–0.4)
EOSINOPHIL NFR BLD AUTO: 6.9 % (ref 0.3–6.2)
ERYTHROCYTE [DISTWIDTH] IN BLOOD BY AUTOMATED COUNT: 13.5 % (ref 12.3–15.4)
GLOBULIN SER CALC-MCNC: 2.7 GM/DL
GLUCOSE SERPL-MCNC: 191 MG/DL (ref 65–99)
HBA1C MFR BLD: 10.2 % (ref 4.8–5.6)
HCT VFR BLD AUTO: 43.5 % (ref 34–46.6)
HDLC SERPL-MCNC: 47 MG/DL (ref 40–60)
HGB BLD-MCNC: 14.1 G/DL (ref 12–15.9)
IMM GRANULOCYTES # BLD AUTO: 0.01 10*3/MM3 (ref 0–0.05)
IMM GRANULOCYTES NFR BLD AUTO: 0.2 % (ref 0–0.5)
LDLC SERPL CALC-MCNC: 76 MG/DL (ref 0–100)
LYMPHOCYTES # BLD AUTO: 2.08 10*3/MM3 (ref 0.7–3.1)
LYMPHOCYTES NFR BLD AUTO: 35.3 % (ref 19.6–45.3)
MCH RBC QN AUTO: 27.8 PG (ref 26.6–33)
MCHC RBC AUTO-ENTMCNC: 32.4 G/DL (ref 31.5–35.7)
MCV RBC AUTO: 85.8 FL (ref 79–97)
MICROALBUMIN UR-MCNC: 10.5 UG/ML
MONOCYTES # BLD AUTO: 0.38 10*3/MM3 (ref 0.1–0.9)
MONOCYTES NFR BLD AUTO: 6.4 % (ref 5–12)
NEUTROPHILS # BLD AUTO: 2.94 10*3/MM3 (ref 1.7–7)
NEUTROPHILS NFR BLD AUTO: 49.8 % (ref 42.7–76)
NRBC BLD AUTO-RTO: 0 /100 WBC (ref 0–0.2)
PLATELET # BLD AUTO: 233 10*3/MM3 (ref 140–450)
POTASSIUM SERPL-SCNC: 4.8 MMOL/L (ref 3.5–5.2)
PROT SERPL-MCNC: 7.1 G/DL (ref 6–8.5)
RBC # BLD AUTO: 5.07 10*6/MM3 (ref 3.77–5.28)
SODIUM SERPL-SCNC: 147 MMOL/L (ref 136–145)
T4 FREE SERPL-MCNC: 0.86 NG/DL (ref 0.93–1.7)
TRIGL SERPL-MCNC: 94 MG/DL (ref 0–150)
TSH SERPL DL<=0.005 MIU/L-ACNC: 17.3 UIU/ML (ref 0.27–4.2)
VLDLC SERPL CALC-MCNC: 18 MG/DL (ref 5–40)
WBC # BLD AUTO: 5.9 10*3/MM3 (ref 3.4–10.8)

## 2021-07-02 RX ORDER — LEVOTHYROXINE SODIUM 0.05 MG/1
50 TABLET ORAL DAILY
Qty: 90 TABLET | Refills: 1 | Status: SHIPPED | OUTPATIENT
Start: 2021-07-02 | End: 2022-01-27

## 2021-11-04 DIAGNOSIS — IMO0002 INSULIN DEPENDENT TYPE 2 DIABETES MELLITUS, UNCONTROLLED: ICD-10-CM

## 2021-12-14 ENCOUNTER — TRANSCRIBE ORDERS (OUTPATIENT)
Dept: LAB | Facility: HOSPITAL | Age: 46
End: 2021-12-14

## 2021-12-14 DIAGNOSIS — Z11.1 SCREENING EXAMINATION FOR PULMONARY TUBERCULOSIS: Primary | ICD-10-CM

## 2021-12-15 ENCOUNTER — LAB (OUTPATIENT)
Dept: LAB | Facility: HOSPITAL | Age: 46
End: 2021-12-15

## 2021-12-15 ENCOUNTER — TRANSCRIBE ORDERS (OUTPATIENT)
Dept: LAB | Facility: HOSPITAL | Age: 46
End: 2021-12-15

## 2021-12-15 DIAGNOSIS — Z11.1 SCREENING FOR TUBERCULOSIS: ICD-10-CM

## 2021-12-15 DIAGNOSIS — Z11.1 SCREENING FOR TUBERCULOSIS: Primary | ICD-10-CM

## 2021-12-15 PROCEDURE — 86480 TB TEST CELL IMMUN MEASURE: CPT

## 2021-12-15 PROCEDURE — 36415 COLL VENOUS BLD VENIPUNCTURE: CPT

## 2021-12-17 LAB
GAMMA INTERFERON BACKGROUND BLD IA-ACNC: 0.03 IU/ML
M TB IFN-G BLD-IMP: NEGATIVE
M TB IFN-G CD4+ BCKGRND COR BLD-ACNC: 0.05 IU/ML
M TB IFN-G CD4+CD8+ BCKGRND COR BLD-ACNC: 0.03 IU/ML
MITOGEN IGNF BLD-ACNC: >10 IU/ML
QUANTIFERON INCUBATION: NORMAL
SERVICE CMNT-IMP: NORMAL

## 2022-01-25 ENCOUNTER — OFFICE VISIT (OUTPATIENT)
Dept: INTERNAL MEDICINE | Facility: CLINIC | Age: 47
End: 2022-01-25

## 2022-01-25 VITALS
TEMPERATURE: 97.1 F | HEART RATE: 81 BPM | SYSTOLIC BLOOD PRESSURE: 128 MMHG | OXYGEN SATURATION: 96 % | RESPIRATION RATE: 16 BRPM | DIASTOLIC BLOOD PRESSURE: 86 MMHG | HEIGHT: 62 IN | BODY MASS INDEX: 43.17 KG/M2 | WEIGHT: 234.6 LBS

## 2022-01-25 DIAGNOSIS — IMO0002 INSULIN DEPENDENT TYPE 2 DIABETES MELLITUS, UNCONTROLLED: Primary | ICD-10-CM

## 2022-01-25 DIAGNOSIS — M89.8X9 BONE PAIN: ICD-10-CM

## 2022-01-25 DIAGNOSIS — E66.01 MORBID OBESITY: ICD-10-CM

## 2022-01-25 DIAGNOSIS — E55.9 VITAMIN D DEFICIENCY: ICD-10-CM

## 2022-01-25 DIAGNOSIS — K21.9 GASTROESOPHAGEAL REFLUX DISEASE, UNSPECIFIED WHETHER ESOPHAGITIS PRESENT: ICD-10-CM

## 2022-01-25 DIAGNOSIS — E03.9 ACQUIRED HYPOTHYROIDISM: ICD-10-CM

## 2022-01-25 DIAGNOSIS — F32.0 MILD SINGLE CURRENT EPISODE OF MAJOR DEPRESSIVE DISORDER: ICD-10-CM

## 2022-01-25 LAB
EXPIRATION DATE: NORMAL
HBA1C MFR BLD: 11.5 %
Lab: NORMAL

## 2022-01-25 PROCEDURE — 99214 OFFICE O/P EST MOD 30 MIN: CPT | Performed by: FAMILY MEDICINE

## 2022-01-25 PROCEDURE — 83036 HEMOGLOBIN GLYCOSYLATED A1C: CPT | Performed by: FAMILY MEDICINE

## 2022-01-25 RX ORDER — LEVOTHYROXINE SODIUM 0.2 MG/1
200 TABLET ORAL DAILY
Qty: 90 TABLET | Refills: 0 | Status: CANCELLED | OUTPATIENT
Start: 2022-01-25

## 2022-01-25 RX ORDER — ORAL SEMAGLUTIDE 3 MG/1
3 TABLET ORAL DAILY
Qty: 30 TABLET | Refills: 1 | Status: SHIPPED | OUTPATIENT
Start: 2022-01-25 | End: 2022-05-04 | Stop reason: DRUGHIGH

## 2022-01-25 RX ORDER — CITALOPRAM 20 MG/1
20 TABLET ORAL DAILY
Qty: 90 TABLET | Refills: 3 | Status: SHIPPED | OUTPATIENT
Start: 2022-01-25 | End: 2022-03-14

## 2022-01-25 NOTE — PROGRESS NOTES
"Subjective    Rox Garner is a 46 y.o. female here for:  Chief Complaint   Patient presents with   • Diabetes     follow up, A1c   • Pain     right leg/knee pain for a couple weeks now       History per MA reviewed.    Leg pain right leg below knee  Aches, like bone/muscle aches  Suspects vitamin D may be low  Has not tried compression socks/stockings, has varicose veins    Has been out of some diabetes mellitus meds  Using 50 units Toujeo  Has not checked sugars recently  New insurance, continuous glucose monitor may go through?    Taking thyroid med, can wait on refill until level checked         The following portions of the patient's history were reviewed and updated as appropriate: allergies, current medications, past family history, past medical history, past social history, past surgical history and problem list.    Review of Systems   Constitutional: Negative for fever.   Musculoskeletal:        Leg pain   Psychiatric/Behavioral: Positive for stress.         Objective   Visit Vitals  /86 (BP Location: Left arm, Patient Position: Sitting, Cuff Size: Adult)   Pulse 81   Temp 97.1 °F (36.2 °C) (Temporal)   Resp 16   Ht 157.5 cm (62\")   Wt 106 kg (234 lb 9.6 oz)   SpO2 96%   BMI 42.91 kg/m²       Physical Exam  Vitals and nursing note reviewed.   Constitutional:       General: She is not in acute distress.     Appearance: Normal appearance. She is well-developed and well-groomed. She is not ill-appearing, toxic-appearing or diaphoretic.      Interventions: Face mask in place.   HENT:      Head: Normocephalic and atraumatic.      Right Ear: Hearing normal.      Left Ear: Hearing normal.   Eyes:      General: No scleral icterus.     Extraocular Movements: Extraocular movements intact.      Comments: Puffiness around both eyes   Neck:      Trachea: Phonation normal.   Pulmonary:      Effort: Pulmonary effort is normal.   Musculoskeletal:      Cervical back: Neck supple.      Comments: No palpable " deformities to right lower leg other than Varicose veins noted.   Skin:     Coloration: Skin is not jaundiced or pale.   Neurological:      General: No focal deficit present.      Mental Status: She is alert and oriented to person, place, and time.      Motor: Motor function is intact.   Psychiatric:         Attention and Perception: Attention and perception normal.         Mood and Affect: Mood and affect normal.         Speech: Speech normal.         Behavior: Behavior normal. Behavior is cooperative.         Thought Content: Thought content normal.         Cognition and Memory: Cognition and memory normal.         Judgment: Judgment normal.         For medical decision making review of the following was required:  Lab Results   Component Value Date    WBC 5.90 06/30/2021    HGB 14.1 06/30/2021    HCT 43.5 06/30/2021    MCV 85.8 06/30/2021     06/30/2021     Lab Results   Component Value Date    GLUCOSE 191 (H) 06/30/2021    BUN 10 06/30/2021    CREATININE 0.79 06/30/2021    EGFRIFNONA 78 06/30/2021    EGFRIFAFRI 95 06/30/2021    BCR 12.7 06/30/2021    K 4.8 06/30/2021    CO2 23.2 06/30/2021    CALCIUM 9.3 06/30/2021    PROTENTOTREF 7.1 06/30/2021    ALBUMIN 4.40 06/30/2021    LABIL2 1.6 06/30/2021    AST 21 06/30/2021    ALT 29 06/30/2021     Lab Results   Component Value Date    HGBA1C 11.5 01/25/2022    HGBA1C 10.20 (H) 06/30/2021    HGBA1C 9.3 06/17/2020         Assessment/Plan     Problem List Items Addressed This Visit        Endocrine and Metabolic    Insulin dependent type 2 diabetes mellitus, uncontrolled (HCC) - Primary    Overview     · Failed metformin xr due to GI side effects  · Failed ozempic, Trulicity due to abdominal pain (severe)         Relevant Medications    empagliflozin (Jardiance) 25 MG tablet tablet    Semaglutide (Rybelsus) 3 MG tablet    Insulin Glargine, 2 Unit Dial, (TOUJEO) 300 UNIT/ML solution pen-injector injection    Continuous Blood Gluc Sensor (FreeStyle [place] 2 Sensor)  misc    Continuous Blood Gluc  (FreeStyle [place] 2 [place]) device    Other Relevant Orders    POC Glycosylated Hemoglobin (Hb A1C) (Completed)    Comprehensive Metabolic Panel    Vitamin B12 & Folate    Acquired hypothyroidism    Relevant Orders    TSH+Free T4    CBC (No Diff)    Morbid obesity (HCC)    Relevant Medications    empagliflozin (Jardiance) 25 MG tablet tablet    Semaglutide (Rybelsus) 3 MG tablet    Vitamin D deficiency    Relevant Orders    TSH+Free T4    Vitamin D 25 Hydroxy    Comprehensive Metabolic Panel    PTH, Intact       Mental Health    Mild single current episode of major depressive disorder (HCC)    Relevant Medications    citalopram (CeleXA) 20 MG tablet    Other Relevant Orders    TSH+Free T4      Other Visit Diagnoses     Bone pain        Relevant Orders    TSH+Free T4    Vitamin D 25 Hydroxy    CBC (No Diff)    Comprehensive Metabolic Panel    Gastroesophageal reflux disease, unspecified whether esophagitis present        Relevant Medications    esomeprazole (nexIUM) 20 MG capsule          · Sample of rybelsus given. Has not tolerated Ozempic injection nor Trulicity. Hold Tradjenta. Discussed tapering up on basal insulin gradually based on fasting sugars. Ideally would have CGM to aid in this.     Return in about 3 months (around 4/30/2022) for Diabetes follow up.     Fior Ramirez MD

## 2022-01-26 LAB
25(OH)D3+25(OH)D2 SERPL-MCNC: 25.5 NG/ML (ref 30–100)
ALBUMIN SERPL-MCNC: 4.4 G/DL (ref 3.5–5.2)
ALBUMIN/GLOB SERPL: 1.4 G/DL
ALP SERPL-CCNC: 99 U/L (ref 39–117)
ALT SERPL-CCNC: 39 U/L (ref 1–33)
AST SERPL-CCNC: 31 U/L (ref 1–32)
BILIRUB SERPL-MCNC: 0.3 MG/DL (ref 0–1.2)
BUN SERPL-MCNC: 12 MG/DL (ref 6–20)
BUN/CREAT SERPL: 14.8 (ref 7–25)
CALCIUM SERPL-MCNC: 9.5 MG/DL (ref 8.6–10.5)
CHLORIDE SERPL-SCNC: 103 MMOL/L (ref 98–107)
CO2 SERPL-SCNC: 25 MMOL/L (ref 22–29)
CREAT SERPL-MCNC: 0.81 MG/DL (ref 0.57–1)
ERYTHROCYTE [DISTWIDTH] IN BLOOD BY AUTOMATED COUNT: 13.6 % (ref 12.3–15.4)
FOLATE SERPL-MCNC: 15.1 NG/ML (ref 4.78–24.2)
GLOBULIN SER CALC-MCNC: 3.2 GM/DL
GLUCOSE SERPL-MCNC: 249 MG/DL (ref 65–99)
HCT VFR BLD AUTO: 45.7 % (ref 34–46.6)
HGB BLD-MCNC: 14.5 G/DL (ref 12–15.9)
MCH RBC QN AUTO: 27.4 PG (ref 26.6–33)
MCHC RBC AUTO-ENTMCNC: 31.7 G/DL (ref 31.5–35.7)
MCV RBC AUTO: 86.4 FL (ref 79–97)
PLATELET # BLD AUTO: 252 10*3/MM3 (ref 140–450)
POTASSIUM SERPL-SCNC: 4.7 MMOL/L (ref 3.5–5.2)
PROT SERPL-MCNC: 7.6 G/DL (ref 6–8.5)
PTH-INTACT SERPL-MCNC: 28 PG/ML (ref 15–65)
RBC # BLD AUTO: 5.29 10*6/MM3 (ref 3.77–5.28)
SODIUM SERPL-SCNC: 140 MMOL/L (ref 136–145)
T4 FREE SERPL-MCNC: 0.96 NG/DL (ref 0.93–1.7)
TSH SERPL-ACNC: 24 UIU/ML (ref 0.27–4.2)
VIT B12 SERPL-MCNC: 755 PG/ML (ref 211–946)
WBC # BLD AUTO: 6.83 10*3/MM3 (ref 3.4–10.8)

## 2022-01-26 NOTE — PROGRESS NOTES
Please contact pt regarding lab results.  TSH is 24, very high. Confirm dose she's taking, if has been skipping doses needs to work on compliance, if taking daily as prescribed I need to change dose. Let me know.    Vitamin D insufficient. Suggest 2000 IU daily over the counter vitamin D3.     Sugar was high, one liver enzyme up slightly from normal likely related to uncontrolled diabetes mellitus.     No other worrisome findings at this time.

## 2022-01-27 ENCOUNTER — TELEPHONE (OUTPATIENT)
Dept: INTERNAL MEDICINE | Facility: CLINIC | Age: 47
End: 2022-01-27

## 2022-01-27 ENCOUNTER — PATIENT MESSAGE (OUTPATIENT)
Dept: INTERNAL MEDICINE | Facility: CLINIC | Age: 47
End: 2022-01-27

## 2022-01-27 RX ORDER — LEVOTHYROXINE SODIUM 200 MCG
200 TABLET ORAL DAILY
Qty: 90 TABLET | Refills: 3 | Status: SHIPPED | OUTPATIENT
Start: 2022-01-27 | End: 2022-01-29 | Stop reason: SDUPTHER

## 2022-01-27 RX ORDER — LEVOTHYROXINE SODIUM 75 MCG
75 TABLET ORAL DAILY
Qty: 90 TABLET | Refills: 3 | Status: SHIPPED | OUTPATIENT
Start: 2022-01-27 | End: 2022-01-29 | Stop reason: SDUPTHER

## 2022-01-27 NOTE — TELEPHONE ENCOUNTER
----- Message from Jeromy Seymour MA sent at 1/27/2022  8:05 AM EST -----  Called pt verbally informed. Pt stated she take 150 mcg daily and has not skipped any doses.    Please advise,   pt requested I call back and leave detailed vm with Dr. Ramirez response.

## 2022-01-29 RX ORDER — LEVOTHYROXINE SODIUM 0.2 MG/1
200 TABLET ORAL DAILY
Qty: 90 TABLET | Refills: 3 | Status: SHIPPED | OUTPATIENT
Start: 2022-01-29

## 2022-01-29 RX ORDER — LEVOTHYROXINE SODIUM 0.07 MG/1
75 TABLET ORAL DAILY
Qty: 90 TABLET | Refills: 3 | Status: SHIPPED | OUTPATIENT
Start: 2022-01-29

## 2022-01-29 NOTE — TELEPHONE ENCOUNTER
From: Rox Garner  To: Fior Ramirez MD  Sent: 1/27/2022 9:41 AM EST  Subject: thyroid    can i take some vit b and ashwagandha vitamins also with my thyroid med wanting to ask first dont want mess it up even more

## 2022-02-02 DIAGNOSIS — J45.30 MILD PERSISTENT ASTHMA, UNSPECIFIED WHETHER COMPLICATED: ICD-10-CM

## 2022-02-02 NOTE — TELEPHONE ENCOUNTER
Chief Complaint: Follow up for Primary Hypothyroidism.     HPI:     Roxane Toth is a 33 y.o. female here for follow up of Primary Hypothyroidism.  She was previously a patient of DEYSI Arce      She remains on Synthroid 100mcg daily which has been her dose for over 6  months.   She reports excellent compliance and denies missing any daily doses.   She takes thyroid hormone prior to breakfast.   She  denies taking any iron, calcium supplements or antacids.      Her energy is good  She reports daily palpitations even on a lower dose of Synthoid  She still reports hair loss and we discovered that her ferritin levels are borderline and B12 levels are borderline so we have discussed replacement therapy or supplementation for both issues    She also has a history of vitamin D deficiency and is on replacement therapy her last vitamin D was adequate at 46 on January 2021  She takes ergocalciferol 50,000 units weekly      Her TSH was 0.650 with a free T4 of 1.40 on Jan 28, 2022    Her TSH was  0.320 with a free T4 1.75 on July 24, 2021 (while on Synthroid 112)        Patient's medications, allergies, and social histories were reviewed and updated as appropriate.      ROS:     CONS:     No fever, no chills   EYES:     No diplopia, no blurry vision   CV:           No chest pain, no palpitations   PULM:     No SOB, no cough, no hemoptysis.   GI:            No nausea, no vomiting, no diarrhea, no constipation   ENDO:     No polyuria, no polydipsia, no heat intolerance, no cold intolerance       Past Medical History:  Problem List:  2022-01: RUBY (generalized anxiety disorder)  2022-01: GERD (gastroesophageal reflux disease)  2022-01: B12 deficiency  2022-01: Falling hair  2022-01: Non-celiac gluten sensitivity  2020-01: Hashimoto's thyroiditis  2020-01: Vitamin D deficiency  2019-01: Tobacco abuse  2017-05: Hot flashes  2017-02: Dyspepsia  2015-05: Subclinical hypothyroidism  2015-05: Family history of  Usman from Pharmacy benefits called regarding Mrs. Garner. The PA for ozempic has been approved for a 30 day supply. The patient will continue to get the medication.   "thyroid cancer  2015: Hypothyroidism  2015: Migraine  2013-10: Abdominal pain, other specified site  2012: Health examination of defined subpopulation      Past Surgical History:  Past Surgical History:   Procedure Laterality Date   • GASTROSCOPY-ENDO  10/25/2013    Performed by Zohaib Felix M.D. at SURGERY [facility]   • EGD WITH ASP/BX  10/25/2013    Performed by Zohaib Felix M.D. at [facility]   • ORIF, ELBOW  1991    right elbow   • TONSILLECTOMY AND ADENOIDECTOMY  as child        Allergies:  Iodine     Social History:  Social History     Tobacco Use   • Smoking status: Former Smoker     Packs/day: 1.00     Years: 17.00     Pack years: 17.00     Types: Cigarettes     Quit date: 2020     Years since quittin.0   • Smokeless tobacco: Never Used   Vaping Use   • Vaping Use: Never used   Substance Use Topics   • Alcohol use: Yes     Alcohol/week: 0.5 oz     Types: 1 Glasses of wine per week     Comment: occasional   • Drug use: Not Currently        Family History:   family history includes Cancer in her mother; Diabetes in an other family member; Heart Disease in an other family member; Hypertension in an other family member; Other in her father; Stroke in an other family member.      PHYSICAL EXAM:   Vital signs: /58 (BP Location: Left arm, Patient Position: Sitting, BP Cuff Size: Adult)   Pulse 74   Ht 1.676 m (5' 6\")   Wt 63.4 kg (139 lb 11.2 oz)   SpO2 99%   Breastfeeding No   BMI 22.55 kg/m²   GENERAL: Well-developed, well-nourished in no apparent distress.   EYE:  No ocular asymmetry, PERRLA  HENT: Pink, moist mucous membranes.    NECK: Thyroid is slightly enlarged and feels bosselated  CARDIOVASCULAR:  No murmurs  LUNGS: Clear breath sounds  ABDOMEN: Soft, nontender   EXTREMITIES: No clubbing, cyanosis, or edema.   NEUROLOGICAL: No gross focal motor abnormalities   LYMPH: No cervical adenopathy seen  SKIN: No rashes, lesions. "       Labs:  Lab Results   Component Value Date/Time    SODIUM 139 01/28/2022 03:32 PM    POTASSIUM 4.4 01/28/2022 03:32 PM    CHLORIDE 106 01/28/2022 03:32 PM    CO2 23 01/28/2022 03:32 PM    ANION 10.0 01/28/2022 03:32 PM    GLUCOSE 89 01/28/2022 03:32 PM    BUN 11 01/28/2022 03:32 PM    CREATININE 0.67 01/28/2022 03:32 PM    CREATININE 0.7 07/30/2007 09:55 PM    CALCIUM 9.3 01/28/2022 03:32 PM    ASTSGOT 19 01/28/2022 03:32 PM    ALTSGPT 15 01/28/2022 03:32 PM    TBILIRUBIN 0.4 01/28/2022 03:32 PM    ALBUMIN 4.5 01/28/2022 03:32 PM    TOTPROTEIN 7.4 01/28/2022 03:32 PM    GLOBULIN 2.9 01/28/2022 03:32 PM    AGRATIO 1.6 01/28/2022 03:32 PM       Lab Results   Component Value Date/Time    SODIUM 138 01/19/2019 0723    POTASSIUM 3.7 01/19/2019 0723    CHLORIDE 105 01/19/2019 0723    CO2 25 01/19/2019 0723    GLUCOSE 90 08/28/2019 0707    BUN 10 01/19/2019 0723    CREATININE 0.73 01/19/2019 0723    CALCIUM 9.8 01/19/2019 0723    ANION 8.0 01/19/2019 0723       Lab Results   Component Value Date/Time    CHOLSTRLTOT 147 08/28/2019 0707    TRIGLYCERIDE 47 08/28/2019 0707    HDL 60 08/28/2019 0707    LDL 78 08/28/2019 0707       Lab Results   Component Value Date/Time    TSHULTRASEN 0.540 01/27/2020 1730     Lab Results   Component Value Date/Time    FREET4 1.18 01/27/2020 1730     Lab Results   Component Value Date/Time    FREET3 3.40 12/09/2019 1726     No results found for: THYSTIMIG    Lab Results   Component Value Date/Time    MICROSOMALA 1556.9 (H) 05/23/2015 1043         Imaging: please refer to US from June 2017      ASSESSMENT/PLAN:     1. Acquired hypothyroidism  Unstable  Her TSH and free T4 levels are optimal however she still reports palpitations but they are getting better  I am adjusting her Synthroid to 88 MCG daily  Follow-up in 6 months with repeat of TSH    2. Hashimoto's thyroiditis  This is the etiology of her hypothyroidism  As proven by her elevated TPO antibodies of 1500 from May 2015    3.  Vitamin D deficiency  Controlled  Continue ergocalciferol weekly  We will repeat her calcium and vitamin D levels in 6 months      4. B12 deficiency  B12 levels are borderline low  I want her to start B12 sublingual 1000 mcg daily  Repeat labs in 6 months    5. Hair loss  Stable  We are going to reassess this again in 6 months after she has started B12  And she is also taking iron supplements      Return in about 6 months (around 8/2/2022).       Thank you kindly for allowing me to participate in the thyroid care plan for this patient.    David Schmitz MD, FACE, [facility]  01/29/20    CC:   JOSEPH Goodman

## 2022-02-10 ENCOUNTER — TELEPHONE (OUTPATIENT)
Dept: INTERNAL MEDICINE | Facility: CLINIC | Age: 47
End: 2022-02-10

## 2022-02-10 ENCOUNTER — OFFICE VISIT (OUTPATIENT)
Dept: INTERNAL MEDICINE | Facility: CLINIC | Age: 47
End: 2022-02-10

## 2022-02-10 VITALS
TEMPERATURE: 97.1 F | HEIGHT: 62 IN | DIASTOLIC BLOOD PRESSURE: 82 MMHG | WEIGHT: 233 LBS | OXYGEN SATURATION: 98 % | SYSTOLIC BLOOD PRESSURE: 132 MMHG | BODY MASS INDEX: 42.88 KG/M2 | HEART RATE: 72 BPM

## 2022-02-10 DIAGNOSIS — R42 DIZZINESS: ICD-10-CM

## 2022-02-10 DIAGNOSIS — H93.8X3 EAR PRESSURE, BILATERAL: Primary | ICD-10-CM

## 2022-02-10 DIAGNOSIS — H66.90 EAR INFECTION: ICD-10-CM

## 2022-02-10 PROCEDURE — 99213 OFFICE O/P EST LOW 20 MIN: CPT | Performed by: INTERNAL MEDICINE

## 2022-02-10 RX ORDER — AMOXICILLIN 500 MG/1
500 CAPSULE ORAL 2 TIMES DAILY
Qty: 10 CAPSULE | Refills: 0 | Status: CANCELLED | OUTPATIENT
Start: 2022-02-10 | End: 2022-02-15

## 2022-02-10 RX ORDER — AMOXICILLIN AND CLAVULANATE POTASSIUM 875; 125 MG/1; MG/1
1 TABLET, FILM COATED ORAL 2 TIMES DAILY
Qty: 14 TABLET | Refills: 0 | Status: SHIPPED | OUTPATIENT
Start: 2022-02-10 | End: 2022-06-22

## 2022-02-10 RX ORDER — AZITHROMYCIN 250 MG/1
TABLET, FILM COATED ORAL
Qty: 6 TABLET | Refills: 0 | Status: CANCELLED | OUTPATIENT
Start: 2022-02-10

## 2022-02-10 NOTE — PROGRESS NOTES
Subjective   Rox Garner is a 46 y.o. female.     Chief Complaint   Patient presents with   • Dizziness     1 day; fullness in ears; no pain        Ms. Garner is a 47 yo female with PMH of DM T2 and hypothyroid, presents today with B/L ear fullness and dizziness and nausea. Symptoms began yesterday. She denies and pain but feels constant pressure and congestion. She denies HA. She has no relief with allergy medication and tylenol sinus. The dizziness worsens with movement and causes nausea. She denies any recent URI. She states that her DM is not managed well since her COVID-19 infection last year 01/2021. She is following up with Dr. Ramirez for this.         Current Outpatient Medications:   •  cholecalciferol (VITAMIN D3) 1000 UNITS tablet, Take 1,000 Units by mouth Daily., Disp: , Rfl:   •  citalopram (CeleXA) 20 MG tablet, Take 1 tablet by mouth Daily., Disp: 90 tablet, Rfl: 3  •  Continuous Blood Gluc  (FreeStyle Paola 2 Norco) device, 1 each Continuous. E11.65, Disp: 1 each, Rfl: 0  •  Continuous Blood Gluc Sensor (FreeStyle Paola 2 Sensor) misc, 1 each Every 14 (Fourteen) Days. E11.65, Disp: 6 each, Rfl: 3  •  empagliflozin (Jardiance) 25 MG tablet tablet, Take 1 tablet by mouth Daily., Disp: 90 tablet, Rfl: 3  •  esomeprazole (nexIUM) 20 MG capsule, Take 20 mg by mouth Every Morning Before Breakfast., Disp: , Rfl:   •  Fluticasone Furoate-Vilanterol (Breo Ellipta) 100-25 MCG/INH inhaler, Inhale 1 puff Daily., Disp: 3 each, Rfl: 3  •  glucose blood test strip, Check sugars fasting and 2 hours after meals., Disp: 200 each, Rfl: 12  •  hydrOXYzine (ATARAX) 10 MG tablet, Take 1 tablet by mouth 3 (Three) Times a Day As Needed for Itching or Allergies., Disp: 270 tablet, Rfl: 0  •  Insulin Glargine, 2 Unit Dial, (TOUJEO) 300 UNIT/ML solution pen-injector injection, Inject 60 Units under the skin into the appropriate area as directed Every Night., Disp: 18 mL, Rfl: 3  •  Insulin Pen Needle  (BD Pen Needle Rosa M U/F) 32G X 4 MM misc, 1 Units Every Night., Disp: 100 each, Rfl: 0  •  levothyroxine (Synthroid) 200 MCG tablet, Take 1 tablet by mouth Daily. Indications: Underactive Thyroid, Disp: 90 tablet, Rfl: 3  •  levothyroxine (Synthroid) 75 MCG tablet, Take 1 tablet by mouth Daily. With 200 mcg to equal 275 mcg, Disp: 90 tablet, Rfl: 3  •  Semaglutide (Rybelsus) 3 MG tablet, Take 1 tablet by mouth Daily., Disp: 30 tablet, Rfl: 1  •  amoxicillin-clavulanate (Augmentin) 875-125 MG per tablet, Take 1 tablet by mouth 2 (Two) Times a Day., Disp: 14 tablet, Rfl: 0    The following portions of the patient's history were reviewed and updated as appropriate: allergies, current medications, past family history, past medical history, past social history, past surgical history and problem list.    Review of Systems   Constitutional: Negative.  Negative for activity change, chills, fatigue and fever.   HENT: Positive for congestion. Negative for ear pain, hearing loss, rhinorrhea, sore throat and tinnitus.         Fullness in B/L ears   Eyes: Negative.  Negative for pain, redness and itching.   Respiratory: Negative.  Negative for cough, shortness of breath and wheezing.    Cardiovascular: Negative.  Negative for chest pain, palpitations and leg swelling.   Gastrointestinal: Positive for nausea. Negative for constipation, diarrhea and vomiting.   Endocrine: Negative.  Negative for polydipsia, polyphagia and polyuria.   Genitourinary: Negative.  Negative for difficulty urinating, flank pain and hematuria.   Musculoskeletal: Negative.    Skin: Negative.    Allergic/Immunologic: Negative.    Neurological: Positive for dizziness. Negative for syncope, weakness and headaches.   Hematological: Negative.  Does not bruise/bleed easily.   Psychiatric/Behavioral: Negative.  Negative for self-injury and suicidal ideas.       Objective   Physical Exam  Constitutional:       Appearance: Normal appearance. She is obese.   HENT:       Head: Normocephalic and atraumatic.      Ears:      Comments: TM pale with fluid on LT  Red TM on RT      Nose: Nose normal.      Mouth/Throat:      Mouth: Mucous membranes are moist.      Pharynx: Oropharynx is clear.   Eyes:      Extraocular Movements: Extraocular movements intact.      Conjunctiva/sclera: Conjunctivae normal.      Pupils: Pupils are equal, round, and reactive to light.   Cardiovascular:      Rate and Rhythm: Normal rate and regular rhythm.      Pulses: Normal pulses.      Heart sounds: Normal heart sounds. No murmur heard.  No friction rub. No gallop.    Pulmonary:      Effort: Pulmonary effort is normal.      Breath sounds: Normal breath sounds. No wheezing.   Abdominal:      General: Bowel sounds are normal.   Musculoskeletal:      Cervical back: Normal range of motion.      Right lower leg: No edema.      Left lower leg: No edema.   Skin:     General: Skin is warm and dry.      Capillary Refill: Capillary refill takes less than 2 seconds.   Neurological:      General: No focal deficit present.      Mental Status: She is alert and oriented to person, place, and time.   Psychiatric:         Mood and Affect: Mood normal.         Behavior: Behavior normal.         Thought Content: Thought content normal.         All tests have been reviewed.    Assessment/Plan   Diagnoses and all orders for this visit:    Ear pressure, bilateral  Pressure x 1 day  Causing discomfort and dizziness  Fluid LT middle ear  -Flonase 2x day  -Antihistamine    Dizziness  Began with ear pressure  Worsened with movement  Fluid LT middle ear  RT ear infection  PT does not require nausea medication at this time  -Flonase 2x day  -Antihistamine    Ear infection, RT  RT Otitis media  Redness on TM  -amoxicillin    Please call clinic if no improvement by day 4

## 2022-02-10 NOTE — TELEPHONE ENCOUNTER
Pt stated that she was seen today and was supposed to have an antibiotic sent into [facility] pharmacy in [place]; but that they dont have it  Pt asked if it could be sent again

## 2022-02-11 ENCOUNTER — PRIOR AUTHORIZATION (OUTPATIENT)
Dept: INTERNAL MEDICINE | Facility: CLINIC | Age: 47
End: 2022-02-11

## 2022-03-13 DIAGNOSIS — F32.0 MILD SINGLE CURRENT EPISODE OF MAJOR DEPRESSIVE DISORDER: ICD-10-CM

## 2022-03-14 RX ORDER — CITALOPRAM 20 MG/1
TABLET ORAL
Qty: 90 TABLET | Refills: 3 | Status: SHIPPED | OUTPATIENT
Start: 2022-03-14

## 2022-03-14 NOTE — TELEPHONE ENCOUNTER
Rx Refill Note  Requested Prescriptions     Pending Prescriptions Disp Refills   • citalopram (CeleXA) 20 MG tablet [Pharmacy Med Name: CITALOPRAM 20MG TABLETS] 90 tablet 3     Sig: TAKE 1 TABLET BY MOUTH EVERY DAY      Last office visit with prescribing clinician: 1/25/2022      Next office visit with prescribing clinician: 5/2/2022            Kitty Morse LPN  03/14/22, 08:49 EDT

## 2022-04-18 ENCOUNTER — PATIENT MESSAGE (OUTPATIENT)
Dept: INTERNAL MEDICINE | Facility: CLINIC | Age: 47
End: 2022-04-18

## 2022-05-04 RX ORDER — ORAL SEMAGLUTIDE 7 MG/1
7 TABLET ORAL DAILY
Qty: 90 TABLET | Refills: 3 | Status: SHIPPED | OUTPATIENT
Start: 2022-05-04

## 2022-07-14 ENCOUNTER — PATIENT MESSAGE (OUTPATIENT)
Dept: INTERNAL MEDICINE | Facility: CLINIC | Age: 47
End: 2022-07-14

## 2022-07-14 DIAGNOSIS — Z79.4 TYPE 2 DIABETES MELLITUS WITH HYPERGLYCEMIA, WITH LONG-TERM CURRENT USE OF INSULIN: ICD-10-CM

## 2022-07-14 DIAGNOSIS — E11.65 TYPE 2 DIABETES MELLITUS WITH HYPERGLYCEMIA, WITH LONG-TERM CURRENT USE OF INSULIN: ICD-10-CM

## 2022-07-14 DIAGNOSIS — E66.01 MORBID OBESITY: ICD-10-CM

## 2022-07-14 DIAGNOSIS — E03.9 ACQUIRED HYPOTHYROIDISM: Primary | ICD-10-CM

## 2022-07-14 NOTE — TELEPHONE ENCOUNTER
From: Rox Garner  To: Fior Ramirez MD  Sent: 7/14/2022 7:16 AM EDT  Subject: Refill     I need a refill on my Jardiance I’m not able to come in right now because you don’t have any late appointments I just started a new job and I can’t takeoff right now is there anyway you can maybe put in a lab order and I can come after I get off work just to get my A1c my thyroid checked that way I can follow up on that my A-1 C is my main concern I had it down to eight and the new medicine is doing OK but I wanna make sure it ain’t went back up so let me know

## 2022-07-15 ENCOUNTER — TELEPHONE (OUTPATIENT)
Dept: INTERNAL MEDICINE | Facility: CLINIC | Age: 47
End: 2022-07-15

## 2022-07-15 NOTE — TELEPHONE ENCOUNTER
Patient did not complete MAMMOGRAM ordered on below.  This order is too old to be used and has been cancelled.

## 2022-07-20 ENCOUNTER — PRIOR AUTHORIZATION (OUTPATIENT)
Dept: INTERNAL MEDICINE | Facility: CLINIC | Age: 47
End: 2022-07-20

## 2022-07-30 ENCOUNTER — PATIENT MESSAGE (OUTPATIENT)
Dept: INTERNAL MEDICINE | Facility: CLINIC | Age: 47
End: 2022-07-30

## 2023-07-21 ENCOUNTER — TELEPHONE (OUTPATIENT)
Dept: INTERNAL MEDICINE | Facility: CLINIC | Age: 48
End: 2023-07-21
Payer: COMMERCIAL

## 2023-07-21 NOTE — TELEPHONE ENCOUNTER
[facility] needs most recent H&P or Physical on pt. Fax [phone] phone

## 2023-07-25 ENCOUNTER — PRIOR AUTHORIZATION (OUTPATIENT)
Dept: INTERNAL MEDICINE | Facility: CLINIC | Age: 48
End: 2023-07-25
Payer: COMMERCIAL

## 2024-01-03 RX ORDER — INSULIN GLARGINE 300 U/ML
60 INJECTION, SOLUTION SUBCUTANEOUS NIGHTLY
Qty: 18 ML | Refills: 0 | Status: SHIPPED | OUTPATIENT
Start: 2024-01-03

## 2024-01-03 NOTE — TELEPHONE ENCOUNTER
Rx Refill Note  Requested Prescriptions     Pending Prescriptions Disp Refills    Insulin Glargine, 1 Unit Dial, (Toujeo SoloStar) 300 UNIT/ML solution pen-injector injection 18 mL 0      Last office visit with prescribing clinician: 7/5/2023   Next office visit with prescribing clinician: 2/21/2024         Jeromy Seymour MA  01/03/24, 13:49 EST

## 2024-02-28 ENCOUNTER — PATIENT MESSAGE (OUTPATIENT)
Dept: INTERNAL MEDICINE | Facility: CLINIC | Age: 49
End: 2024-02-28
Payer: COMMERCIAL

## 2024-02-28 NOTE — TELEPHONE ENCOUNTER
From: Rox Garner  To: Fior Ramirez  Sent: 2/28/2024 11:38 AM EST  Subject: Pa    I need a pa for my Jardiance

## 2024-02-29 ENCOUNTER — TELEPHONE (OUTPATIENT)
Dept: INTERNAL MEDICINE | Facility: CLINIC | Age: 49
End: 2024-02-29
Payer: COMMERCIAL

## 2024-02-29 NOTE — TELEPHONE ENCOUNTER
Caller: [facility] [address] [place] [address] [phone] [facility] [phone]     Relationship: Pharmacy    Best call back number: [phone]     What test/procedure requested: PRIOR AUTHORIZATION FOR THE JARDIANCE 25 MG

## 2024-03-01 ENCOUNTER — PRIOR AUTHORIZATION (OUTPATIENT)
Dept: INTERNAL MEDICINE | Facility: CLINIC | Age: 49
End: 2024-03-01
Payer: COMMERCIAL

## 2024-03-01 NOTE — TELEPHONE ENCOUNTER
PA Case: [id number], Status: Approved, Coverage Starts on: 3/1/2024 12:00:00 AM, Coverage Ends on: 3/1/2025 12:00:00 AM.    Patient notified of approval and cost 45.00 for 90 day supply

## 2024-03-01 NOTE — TELEPHONE ENCOUNTER
Jardiance 25MG tablets    Key: [id number]    PA Case: [id number], Status: Approved, Coverage Starts on: 3/1/2024 12:00:00 AM, Coverage Ends on: 3/1/2025 12:00:00 AM.

## 2024-08-08 ENCOUNTER — PRIOR AUTHORIZATION (OUTPATIENT)
Dept: INTERNAL MEDICINE | Facility: CLINIC | Age: 49
End: 2024-08-08
Payer: COMMERCIAL

## 2024-08-08 NOTE — TELEPHONE ENCOUNTER
Received PA request for Rybelsus and I noticed that patient has not been in in over a year. I spoke with patient, wanted to see if she can schedule an appt. She said she started a new job and cannot come in at this time. Will have to call back. She also said to disregard the PA request. Stated that she does not take this med.

## 2024-10-25 ENCOUNTER — TELEPHONE (OUTPATIENT)
Dept: INTERNAL MEDICINE | Facility: CLINIC | Age: 49
End: 2024-10-25

## 2024-10-25 ENCOUNTER — OFFICE VISIT (OUTPATIENT)
Dept: INTERNAL MEDICINE | Facility: CLINIC | Age: 49
End: 2024-10-25
Payer: COMMERCIAL

## 2024-10-25 VITALS
OXYGEN SATURATION: 98 % | HEART RATE: 78 BPM | BODY MASS INDEX: 39.53 KG/M2 | WEIGHT: 214.8 LBS | DIASTOLIC BLOOD PRESSURE: 80 MMHG | HEIGHT: 62 IN | SYSTOLIC BLOOD PRESSURE: 120 MMHG | TEMPERATURE: 97.8 F

## 2024-10-25 DIAGNOSIS — E11.65 TYPE 2 DIABETES MELLITUS WITH HYPERGLYCEMIA, WITH LONG-TERM CURRENT USE OF INSULIN: Primary | ICD-10-CM

## 2024-10-25 DIAGNOSIS — E03.9 ACQUIRED HYPOTHYROIDISM: ICD-10-CM

## 2024-10-25 DIAGNOSIS — E55.9 VITAMIN D DEFICIENCY: ICD-10-CM

## 2024-10-25 DIAGNOSIS — Z51.81 MEDICATION MONITORING ENCOUNTER: ICD-10-CM

## 2024-10-25 DIAGNOSIS — R79.9 ABNORMAL BLOOD CHEMISTRY: ICD-10-CM

## 2024-10-25 DIAGNOSIS — Z12.31 ENCOUNTER FOR SCREENING MAMMOGRAM FOR BREAST CANCER: ICD-10-CM

## 2024-10-25 DIAGNOSIS — Z79.4 TYPE 2 DIABETES MELLITUS WITH HYPERGLYCEMIA, WITH LONG-TERM CURRENT USE OF INSULIN: Primary | ICD-10-CM

## 2024-10-25 DIAGNOSIS — Z91.199 MEDICALLY NONCOMPLIANT: ICD-10-CM

## 2024-10-25 PROBLEM — H93.8X3 EAR PRESSURE, BILATERAL: Status: RESOLVED | Noted: 2022-02-10 | Resolved: 2024-10-25

## 2024-10-25 PROBLEM — H66.90 EAR INFECTION: Status: RESOLVED | Noted: 2022-02-10 | Resolved: 2024-10-25

## 2024-10-25 PROBLEM — R21 RASH AND NONSPECIFIC SKIN ERUPTION: Status: RESOLVED | Noted: 2020-02-10 | Resolved: 2024-10-25

## 2024-10-25 PROBLEM — R42 DIZZINESS: Status: RESOLVED | Noted: 2022-02-10 | Resolved: 2024-10-25

## 2024-10-25 LAB
25(OH)D3+25(OH)D2 SERPL-MCNC: 17 NG/ML (ref 30–100)
ALBUMIN SERPL-MCNC: 3.9 G/DL (ref 3.5–5.2)
ALBUMIN/GLOB SERPL: 1.3 G/DL
ALP SERPL-CCNC: 89 U/L (ref 39–117)
ALT SERPL-CCNC: 19 U/L (ref 1–33)
AST SERPL-CCNC: 15 U/L (ref 1–32)
BILIRUB SERPL-MCNC: 0.2 MG/DL (ref 0–1.2)
BUN SERPL-MCNC: 12 MG/DL (ref 6–20)
BUN/CREAT SERPL: 14.5 (ref 7–25)
CALCIUM SERPL-MCNC: 9 MG/DL (ref 8.6–10.5)
CHLORIDE SERPL-SCNC: 104 MMOL/L (ref 98–107)
CHOLEST SERPL-MCNC: 150 MG/DL (ref 0–200)
CO2 SERPL-SCNC: 26.8 MMOL/L (ref 22–29)
CREAT SERPL-MCNC: 0.83 MG/DL (ref 0.57–1)
EGFRCR SERPLBLD CKD-EPI 2021: 86 ML/MIN/1.73
ERYTHROCYTE [DISTWIDTH] IN BLOOD BY AUTOMATED COUNT: 13.3 % (ref 12.3–15.4)
EXPIRATION DATE: NORMAL
GLOBULIN SER CALC-MCNC: 2.9 GM/DL
GLUCOSE SERPL-MCNC: 176 MG/DL (ref 65–99)
HBA1C MFR BLD: 11.4 % (ref 4.8–5.6)
HCT VFR BLD AUTO: 42.6 % (ref 34–46.6)
HDLC SERPL-MCNC: 50 MG/DL (ref 40–60)
HGB BLD-MCNC: 13.1 G/DL (ref 12–15.9)
LDLC SERPL CALC-MCNC: 79 MG/DL (ref 0–100)
Lab: NORMAL
MCH RBC QN AUTO: 26.9 PG (ref 26.6–33)
MCHC RBC AUTO-ENTMCNC: 30.8 G/DL (ref 31.5–35.7)
MCV RBC AUTO: 87.5 FL (ref 79–97)
PLATELET # BLD AUTO: 254 10*3/MM3 (ref 140–450)
POC CREATININE URINE: 100
POC MICROALBUMIN URINE: 30
POTASSIUM SERPL-SCNC: 4.4 MMOL/L (ref 3.5–5.2)
PROT SERPL-MCNC: 6.8 G/DL (ref 6–8.5)
RBC # BLD AUTO: 4.87 10*6/MM3 (ref 3.77–5.28)
SODIUM SERPL-SCNC: 140 MMOL/L (ref 136–145)
T4 FREE SERPL-MCNC: 0.7 NG/DL (ref 0.92–1.68)
TRIGL SERPL-MCNC: 115 MG/DL (ref 0–150)
TSH SERPL DL<=0.005 MIU/L-ACNC: 20.4 UIU/ML (ref 0.27–4.2)
VIT B12 SERPL-MCNC: 465 PG/ML (ref 211–946)
VLDLC SERPL CALC-MCNC: 21 MG/DL (ref 5–40)
WBC # BLD AUTO: 7.04 10*3/MM3 (ref 3.4–10.8)

## 2024-10-25 PROCEDURE — 99214 OFFICE O/P EST MOD 30 MIN: CPT | Performed by: FAMILY MEDICINE

## 2024-10-25 PROCEDURE — 82044 UR ALBUMIN SEMIQUANTITATIVE: CPT | Performed by: FAMILY MEDICINE

## 2024-10-25 RX ORDER — CLOBETASOL PROPIONATE 0.5 MG/G
CREAM TOPICAL
COMMUNITY
Start: 2024-09-11

## 2024-10-25 RX ORDER — PROCHLORPERAZINE 25 MG/1
1 SUPPOSITORY RECTAL CONTINUOUS
Qty: 1 EACH | Refills: 1 | Status: SHIPPED | OUTPATIENT
Start: 2024-10-25

## 2024-10-25 RX ORDER — PROCHLORPERAZINE 25 MG/1
1 SUPPOSITORY RECTAL ONCE
Qty: 1 EACH | Refills: 0 | Status: SHIPPED | OUTPATIENT
Start: 2024-10-25 | End: 2024-10-25

## 2024-10-25 RX ORDER — PROCHLORPERAZINE 25 MG/1
SUPPOSITORY RECTAL
Qty: 3 EACH | Refills: 11 | Status: SHIPPED | OUTPATIENT
Start: 2024-10-25

## 2024-10-25 RX ORDER — INSULIN GLARGINE 300 U/ML
60 INJECTION, SOLUTION SUBCUTANEOUS NIGHTLY
Qty: 18 ML | Refills: 0 | Status: SHIPPED | OUTPATIENT
Start: 2024-10-25

## 2024-10-25 NOTE — PROGRESS NOTES
"Chief Complaint  Diabetes (Med. refills. )    Subjective        Rox Garner presents to [facility]  Diabetes  She presents for her follow-up diabetic visit. She has type 2 diabetes mellitus. Current diabetic treatment includes oral agent (monotherapy) and insulin injections. There is no compliance with monitoring of blood glucose. Home blood sugar record trend: unknown by patient. An ACE inhibitor/angiotensin II receptor blocker is not being taken.  Eye exam is not current.   Additional comments: Had to come off Rybelsus as it made her sick (Ozempic and Trulicity did as well).       Objective   Vital Signs:  /80   Pulse 78   Temp 97.8 °F (36.6 °C)   Ht 157.5 cm (62\")   Wt 97.4 kg (214 lb 12.8 oz)   SpO2 98%   BMI 39.29 kg/m²   Estimated body mass index is 39.29 kg/m² as calculated from the following:    Height as of this encounter: 157.5 cm (62\").    Weight as of this encounter: 97.4 kg (214 lb 12.8 oz).    Class 2 Severe Obesity (BMI >=35 and <=39.9). Obesity-related health conditions include the following: diabetes mellitus. Obesity is unchanged. BMI is is above average; BMI management plan is completed. We discussed Information on healthy weight added to patient's after visit summary.       Physical Exam  Vitals and nursing note reviewed.   Constitutional:       General: She is not in acute distress.     Appearance: Normal appearance. She is well-developed and well-groomed. She is obese. She is not ill-appearing, toxic-appearing or diaphoretic.   HENT:      Head: Normocephalic and atraumatic.      Right Ear: Hearing normal.      Left Ear: Hearing normal.   Eyes:      General: Lids are normal. No scleral icterus.     Extraocular Movements: Extraocular movements intact.   Neck:      Trachea: Phonation normal.   Cardiovascular:      Rate and Rhythm: Normal rate and regular rhythm.   Pulmonary:      Effort: Pulmonary effort is normal.      Breath sounds: Normal " breath sounds.   Musculoskeletal:      Cervical back: Neck supple.   Skin:     Coloration: Skin is not jaundiced or pale.   Neurological:      General: No focal deficit present.      Mental Status: She is alert and oriented to person, place, and time.      Motor: Motor function is intact.   Psychiatric:         Attention and Perception: Attention and perception normal.         Mood and Affect: Mood and affect normal.         Speech: Speech normal.         Behavior: Behavior normal. Behavior is cooperative.         Thought Content: Thought content normal.         Cognition and Memory: Cognition and memory normal.         Judgment: Judgment normal.        Result Review :  The following data was reviewed by: Fior Ramirez MD on 10/25/2024:  Lab Results   Component Value Date    HGBA1C 12.50 (H) 07/05/2023    HGBA1C 11.5 01/25/2022    HGBA1C 10.20 (H) 06/30/2021      Lab Results   Component Value Date    TSH 61.400 (H) 07/05/2023     Lab Results   Component Value Date    FREET4 0.39 (L) 07/05/2023      Lab Results   Component Value Date    CHLPL 176 07/05/2023    TRIG 152 (H) 07/05/2023    HDL 54 07/05/2023    LDL 96 07/05/2023      Office Visit on 10/25/2024   Component Date Value Ref Range Status    Microalbumin, Urine 10/25/2024 30   Final    Creatinine, Urine 10/25/2024 100   Final    Lot Number 10/25/2024 [id number]   Final    Expiration Date 10/25/2024 08/31/2025   Final             Assessment and Plan   Diagnoses and all orders for this visit:    1. Type 2 diabetes mellitus with hyperglycemia, with long-term current use of insulin (Primary)  -     POC Microalbumin  -     Continuous Glucose Transmitter (Dexcom G6 Transmitter) misc; Use 1 each 1 (One) Time for 1 dose.  Dispense: 1 each; Refill: 0  -     Continuous Glucose Sensor (Dexcom G6 Sensor); Use Every 10 (Ten) Days. Apply as directed  Dispense: 3 each; Refill: 11  -     Continuous Glucose  (Dexcom G6 ) device; Use 1 each Continuous.   Dispense: 1 each; Refill: 1  -     empagliflozin (Jardiance) 25 MG tablet tablet; Take 1 tablet by mouth Daily. For diabetes.  Dispense: 90 tablet; Refill: 3  -     Insulin Glargine, 1 Unit Dial, (Toujeo SoloStar) 300 UNIT/ML solution pen-injector injection; Inject 60 Units under the skin into the appropriate area as directed Every Night.  Dispense: 18 mL; Refill: 0  -     Hemoglobin A1c  -     Comprehensive Metabolic Panel  -     Lipid Panel  -     TSH+Free T4  -     Vitamin B12    2. Acquired hypothyroidism  -     CBC (No Diff)  -     TSH+Free T4    3. Vitamin D deficiency  -     Vitamin D,25-Hydroxy    4. Abnormal blood chemistry  -     Hemoglobin A1c  -     CBC (No Diff)  -     Comprehensive Metabolic Panel  -     Lipid Panel  -     TSH+Free T4  -     Vitamin D,25-Hydroxy  -     Vitamin B12    5. Medication monitoring encounter  -     Hemoglobin A1c  -     CBC (No Diff)  -     Comprehensive Metabolic Panel  -     Lipid Panel  -     TSH+Free T4  -     Vitamin D,25-Hydroxy  -     Vitamin B12    6. Encounter for screening mammogram for breast cancer  -     Mammo Screening Digital Tomosynthesis Bilateral With CAD; Future    7. Medically noncompliant  Comments:  Patient is not taking care of herself and encouraged changes. Needs eye exam, monitor glucose levels, etc. Recurrent issue.             Follow Up   Return in about 3 months (around 1/27/2025) for Annual physical.  Patient was given instructions and counseling regarding her condition or for health maintenance advice. Please see specific information pulled into the AVS if appropriate.

## 2024-10-25 NOTE — TELEPHONE ENCOUNTER
"Relay     \"Normal result. Need to consider adding medicine such as lisinopril or losartan (ACE-I or ARB) for kidney protection to lessen risk of kidney damage from diabetes (recommended for all diabetics), can discuss next visit. \"                 "

## 2024-10-25 NOTE — TELEPHONE ENCOUNTER
Name: Pascual Rox Hyatt      Relationship: Self      Best Callback Number: [phone]       HUB PROVIDED THE RELAY MESSAGE FROM THE OFFICE      PATIENT: HAS FURTHER QUESTIONS AND WOULD LIKE A CALL BACK AT THE FOLLOWING PHONE [phone]    ADDITIONAL INFORMATION: PATIENT WOULD LIKE TO KNOW WHAT HER A1C WAS.

## 2024-10-29 ENCOUNTER — PATIENT MESSAGE (OUTPATIENT)
Dept: INTERNAL MEDICINE | Facility: CLINIC | Age: 49
End: 2024-10-29
Payer: COMMERCIAL

## 2024-10-30 ENCOUNTER — PRIOR AUTHORIZATION (OUTPATIENT)
Dept: INTERNAL MEDICINE | Facility: CLINIC | Age: 49
End: 2024-10-30
Payer: COMMERCIAL

## 2024-10-30 RX ORDER — CITALOPRAM HYDROBROMIDE 10 MG/1
10 TABLET ORAL DAILY
Qty: 90 TABLET | Refills: 1 | Status: SHIPPED | OUTPATIENT
Start: 2024-10-30

## 2024-10-30 NOTE — TELEPHONE ENCOUNTER
Rox Garner (Key: [id number])  PA Case ID #: [id number]  Rx #: [id number]  Need Help? Call us at [phone]  Status  Additional Information Required  Drug  Dexcom G6 Sensor  Form  [employer] Electronic PA Form (2017 [employer])

## 2024-10-30 NOTE — TELEPHONE ENCOUNTER
Rox Garner (Key: [id number])  PA Case ID #: [id number]  Rx #: [id number]  Need Help? Call us at [phone]  Status  sent iconSent to Plan today  Drug  Dexcom G6  device  Form  [employer] Electronic PA Form (2017 NCPDP)

## 2024-10-31 ENCOUNTER — TELEPHONE (OUTPATIENT)
Dept: INTERNAL MEDICINE | Facility: CLINIC | Age: 49
End: 2024-10-31
Payer: COMMERCIAL

## 2024-10-31 DIAGNOSIS — Z79.4 TYPE 2 DIABETES MELLITUS WITH HYPERGLYCEMIA, WITH LONG-TERM CURRENT USE OF INSULIN: Primary | ICD-10-CM

## 2024-10-31 DIAGNOSIS — E03.9 ACQUIRED HYPOTHYROIDISM: ICD-10-CM

## 2024-10-31 DIAGNOSIS — E11.65 TYPE 2 DIABETES MELLITUS WITH HYPERGLYCEMIA, WITH LONG-TERM CURRENT USE OF INSULIN: Primary | ICD-10-CM

## 2024-10-31 NOTE — TELEPHONE ENCOUNTER
"LVM for pt to return call to our office for lab results.    Relay     \"Diabetes remains out of control. Patient has had an A1C of 8.6 or higher since at least 2019, has been greater than 10 since June 2021. She requires more intensive management and needs to see endocrinology. Referral has been placed.     Similar situation with her hypothyroidism, we've not had a TSH in normal range x five years. This also needs addressed with endocrinology.     Failure to improve these chronic conditions may cause long term health problems such as heart disease (increased risk of heart attack, heart failure), kidney disease, vision loss, memory loss, etc.     Vitamin D low. Suggest taking vitamin D3 over the counter 1000 IU daily and trying to get 15 min of sun exposure daily to face and arms (when possible). \"                  "

## 2024-11-01 NOTE — TELEPHONE ENCOUNTER
We denied your request because we did not see what we need to approve the device   you asked for, (Dexcom G6 ). We may be able to approve this device in a certain   situation (if you need insulin more than once a day or you need a certain device [insulin   pump] to control your blood sugar).

## 2024-11-04 NOTE — TELEPHONE ENCOUNTER
We denied your request because we did not see what we need to approve the device   you asked for, (Dexcom G6 Sensor). We may be able to approve this device in a certain   situation (if you need insulin more than once a day or you need a certain device [insulin   pump] to control your blood sugar). We do not see that this applies to you.

## 2024-11-04 NOTE — TELEPHONE ENCOUNTER
We denied your request because we did not see what we need to approve the device   you asked for, (Dexcom G6 ). We may be able to approve this device in a certain   situation (if you need insulin more than once a day or you need a certain device [insulin   pump] to control your blood sugar). We do not see that this applies to you.

## 2025-01-13 DIAGNOSIS — E03.9 ACQUIRED HYPOTHYROIDISM: ICD-10-CM

## 2025-01-13 RX ORDER — LEVOTHYROXINE SODIUM 300 UG/1
300 TABLET ORAL
Qty: 90 TABLET | Refills: 1 | Status: SHIPPED | OUTPATIENT
Start: 2025-01-13

## 2025-03-10 DIAGNOSIS — E03.9 ACQUIRED HYPOTHYROIDISM: ICD-10-CM

## 2025-03-10 RX ORDER — CITALOPRAM HYDROBROMIDE 10 MG/1
10 TABLET ORAL DAILY
Qty: 90 TABLET | Refills: 1 | Status: SHIPPED | OUTPATIENT
Start: 2025-03-10

## 2025-03-10 RX ORDER — LEVOTHYROXINE SODIUM 300 UG/1
300 TABLET ORAL
Qty: 90 TABLET | Refills: 1 | OUTPATIENT
Start: 2025-03-10

## 2025-05-13 ENCOUNTER — OFFICE VISIT (OUTPATIENT)
Age: 50
End: 2025-05-13
Payer: COMMERCIAL

## 2025-05-13 VITALS
HEART RATE: 76 BPM | OXYGEN SATURATION: 97 % | HEIGHT: 62 IN | SYSTOLIC BLOOD PRESSURE: 122 MMHG | WEIGHT: 239.9 LBS | BODY MASS INDEX: 44.15 KG/M2 | DIASTOLIC BLOOD PRESSURE: 80 MMHG

## 2025-05-13 DIAGNOSIS — E11.65 TYPE 2 DIABETES MELLITUS WITH HYPERGLYCEMIA, WITH LONG-TERM CURRENT USE OF INSULIN: Primary | ICD-10-CM

## 2025-05-13 DIAGNOSIS — E03.9 ACQUIRED HYPOTHYROIDISM: ICD-10-CM

## 2025-05-13 DIAGNOSIS — Z79.4 TYPE 2 DIABETES MELLITUS WITH HYPERGLYCEMIA, WITH LONG-TERM CURRENT USE OF INSULIN: Primary | ICD-10-CM

## 2025-05-13 LAB
EXPIRATION DATE: ABNORMAL
EXPIRATION DATE: ABNORMAL
GLUCOSE BLDC GLUCOMTR-MCNC: 232 MG/DL (ref 70–130)
HBA1C MFR BLD: 10.9 % (ref 4.5–5.7)
Lab: ABNORMAL
Lab: ABNORMAL

## 2025-05-13 RX ORDER — REPAGLINIDE 1 MG/1
1 TABLET ORAL
Qty: 270 TABLET | Refills: 1 | Status: SHIPPED | OUTPATIENT
Start: 2025-05-13

## 2025-05-13 RX ORDER — LEVOTHYROXINE SODIUM 300 UG/1
300 TABLET ORAL
Qty: 90 TABLET | Refills: 1 | Status: SHIPPED | OUTPATIENT
Start: 2025-05-13

## 2025-05-13 RX ORDER — AVOBENZONE, HOMOSALATE, OCTISALATE, OCTOCRYLENE 30; 40; 45; 26 MG/ML; MG/ML; MG/ML; MG/ML
1 CREAM TOPICAL 3 TIMES DAILY
Qty: 200 EACH | Refills: 3 | Status: SHIPPED | OUTPATIENT
Start: 2025-05-13

## 2025-05-13 RX ORDER — INSULIN GLARGINE 300 U/ML
64 INJECTION, SOLUTION SUBCUTANEOUS NIGHTLY
Qty: 24 ML | Refills: 0 | Status: SHIPPED | OUTPATIENT
Start: 2025-05-13

## 2025-05-13 NOTE — PROGRESS NOTES
Chief Complaint   Patient presents with    Diabetes    Hypothyroidism      Rox Garner is a 49 y.o. female presents today for evaluation of diabetes, hypothyroidism. Referring Provider: Fior Ramirez MD.     Hemoglobin A1C   Date Value Ref Range Status   05/13/2025 10.9 (A) 4.5 - 5.7 % Final     - BG at home: not checking at home; needs new strips; average 200-300s fasting  Previously using Dexcom G6/tiago (stopped due to cost)     - Admits intermittent leg cramping, joint pain  - Admits intermittent numbness; improves with pain cream as needed  - Admits heartburn/reflux; taking ppi with relief about every other day in morning    - Denies hypoglycemia.   - Denies vision changes.   - Denies numbness. Denies open sores.   - Denies constipation, diarrhea, abdominal pain.  - Denies increased thirst or urination.   - Denies burning with urination.   - Denies SOB, chest pain.    Diet: Meals: 2x/day Breakfast: oatmeal cups Lunch: not often Dinner: meat + CHO + vegetable Snacks/Dessert: occasional chocolate Drinks: water with diet flavoring, diet dr. Cunningham  Exercise: minimal; walking on weekends      Medical history: Type 2 diabetes, hypothyroidism, asthma, vitamin D deficiency    Type 2 Diabetes:   Diagnosed with diabetes: around 2007   Complications: denies    Current regimen includes: Jardiance 25 mg, Toujeo 60 units   Has tried: metformin (GI intolerance), Rybelsus (GI intolerance), Tradjenta (tolerated well, does not recall why stopped), Ozempic/Trulicity (GI intolerance with increased dose),  not ideal candidate for sulfonylurea due to sulfa allergy  Compliance with medications is good.    - HLD: not taking statin     -Diabetes education/nutritionist: denies in past; declines referral at this time     Denies history of pancreatitis.  Denies family history of MENs, thyroid cancers.   Admits family history of diabetes: mother, father.   Denies family history cardiovascular disease.      [facility]  Maintenance:  Ophthalmology: >1 year; declines referral   Monofilament / Foot exam: performed today   Urine microalbumin: cr: 30 10/25/2024  LDL: 79 10/25/2024     Hypothyroidism:  Diagnosed with hypothyroidism around ; taking levothyroxine since.   Admits mother with thyroid disease.  Denies thyroid imaging in past.     Labs from  10/25/2024 TSH: 20.4, TSH: 0.70   2023 TSH: 61.4, T4: 0.39     Current regimen includes: Levothyroxine 300 mcg  Ran out of medication about 3 weeks ago. Has run out of medication in past possibly contributing to abnormal labs.    Taking first thing in morning without food or hot beverages.   Takes PPI at same time.   Denies biotin supplement or multivitamin.     The following portions of the patient's history were reviewed and updated as appropriate: allergies, current medications, past family history, past medical history, past social history, past surgical history and problem list.     Past Medical History:   Diagnosis Date    Allergic     Asthma     Depression     Diabetes mellitus     TYPE II    Hypothyroidism     Obesity     Visual impairment     Vitamin D deficiency      Past Surgical History:   Procedure Laterality Date     SECTION      X2    CHOLECYSTECTOMY      TUBAL ABDOMINAL LIGATION        Family History   Problem Relation Age of Onset    Diabetes Mother     Hypertension Mother     Hyperlipidemia Mother     Thyroid disease Mother     Diabetes Father     Hypertension Father     Mental illness Father     Stroke Father     Kidney disease Sister     Migraines Sister     Breast cancer Neg Hx     Ovarian cancer Neg Hx       Social History     Socioeconomic History    Marital status:    Tobacco Use    Smoking status: Never    Smokeless tobacco: Never   Vaping Use    Vaping status: Never Used   Substance and Sexual Activity    Alcohol use: No    Drug use: No    Sexual activity: Yes     Partners: Male     Birth control/protection: Surgical      Allergies    Allergen Reactions    Ozempic (0.25 Or 0.5 Mg-Dose) [Semaglutide(0.25 Or 0.5mg-Dos)] Nausea And Vomiting     Abdominal pain severe    Trulicity [Dulaglutide] Nausea And Vomiting     Severe abdominal pain    Sulfa Antibiotics Unknown - Low Severity     Childhood allergy       Current Outpatient Medications on File Prior to Visit   Medication Sig Dispense Refill    cholecalciferol (VITAMIN D3) 1000 UNITS tablet Take 1 tablet by mouth Daily.      citalopram (CeleXA) 10 MG tablet Take 1 tablet by mouth Daily. 90 tablet 1    esomeprazole (nexIUM) 20 MG capsule Take 1 capsule by mouth Every Morning Before Breakfast.      Insulin Pen Needle (BD Pen Needle Rosa M U/F) 32G X 4 MM misc 1 Units Every Night. 100 each 0    [DISCONTINUED] empagliflozin (Jardiance) 25 MG tablet tablet Take 1 tablet by mouth Daily. For diabetes. 90 tablet 3    [DISCONTINUED] Insulin Glargine, 1 Unit Dial, (Toujeo SoloStar) 300 UNIT/ML solution pen-injector injection Inject 60 Units under the skin into the appropriate area as directed Every Night. 18 mL 0    [DISCONTINUED] levothyroxine (Synthroid) 300 MCG tablet Take 1 tablet by mouth Every Morning. 90 tablet 1    [DISCONTINUED] clobetasol propionate (TEMOVATE) 0.05 % cream APPLY CREAM TOPICALLY TWICE DAILY AS NEEDED      [DISCONTINUED] Continuous Glucose  (Dexcom G6 ) device Use 1 each Continuous. 1 each 1    [DISCONTINUED] Continuous Glucose Sensor (Dexcom G6 Sensor) Use Every 10 (Ten) Days. Apply as directed 3 each 11    [DISCONTINUED] glucose blood test strip Check sugars fasting and 2 hours after meals. 200 each 12     No current facility-administered medications on file prior to visit.        Review of Systems   Constitutional:  Positive for fatigue. Negative for activity change, appetite change, unexpected weight gain and unexpected weight loss.   Eyes:  Negative for visual disturbance.   Respiratory:  Negative for shortness of breath.    Cardiovascular:  Negative for chest  "pain.   Gastrointestinal:  Negative for abdominal pain, constipation and diarrhea.   Endocrine: Negative for polydipsia and polyuria.   Musculoskeletal:  Positive for arthralgias.   Skin:  Negative for rash and skin lesions.   Allergic/Immunologic: Positive for environmental allergies.   Neurological:  Negative for dizziness and numbness.   Psychiatric/Behavioral:  Positive for sleep disturbance.         /80 (BP Location: Left arm, Patient Position: Sitting, Cuff Size: Adult)   Pulse 76   Ht 157.5 cm (62\")   Wt 109 kg (239 lb 14.4 oz)   LMP 04/18/2020   SpO2 97%   BMI 43.88 kg/m²      Physical Exam  Constitutional:       Appearance: Normal appearance. She is obese.   Neck:      Thyroid: No thyroid mass, thyromegaly or thyroid tenderness.   Cardiovascular:      Rate and Rhythm: Normal rate and regular rhythm.      Pulses:           Dorsalis pedis pulses are 2+ on the right side and 2+ on the left side.        Posterior tibial pulses are 2+ on the right side and 2+ on the left side.   Pulmonary:      Effort: Pulmonary effort is normal.   Musculoskeletal:         General: Normal range of motion.      Right foot: No deformity.      Left foot: No deformity.   Feet:      Right foot:      Protective Sensation: 5 sites tested.  5 sites sensed.      Skin integrity: Skin integrity normal. No ulcer.      Toenail Condition: Right toenails are normal.      Left foot:      Protective Sensation: 5 sites tested.  5 sites sensed.      Skin integrity: Skin integrity normal. No ulcer.      Toenail Condition: Left toenails are normal.      Comments: Diabetic Foot Exam Performed and Monofilament Test Performed     Skin:     General: Skin is warm and dry.   Neurological:      General: No focal deficit present.      Mental Status: She is alert and oriented to person, place, and time.   Psychiatric:         Mood and Affect: Mood normal.         Behavior: Behavior normal.         LABS AND IMAGING  CMP:  Lab Results   Component " Value Date    Glucose 232 (A) 2025    Glucose,  mg/dL (A) 2023    Glucose, UA >=1000 mg/dL (3+) (A) 2020    BUN 12 10/25/2024    BUN 7 2020    BUN/Creatinine Ratio 14.5 10/25/2024    BUN/Creatinine Ratio 10.3 2020    Creatinine 0.83 10/25/2024    Creatinine 0.68 2020    Creatinine, Urine 100 10/25/2024    eGFR  Am 91 2022    eGFR Non African Am 75 2022    eGFR Non African Amer 93 2020    EGFR Result 86.0 10/25/2024    Ketones, UA Negative 2023    Ketones, UA Negative 2020    CO2 25.8 2020    Total CO2 26.8 10/25/2024    Calcium 9.0 10/25/2024    Calcium 8.9 2020    Albumin 3.9 10/25/2024    Albumin 4.00 2020    AST (SGOT) 15 10/25/2024    AST (SGOT) 15 2020    ALT (SGPT) 19 10/25/2024    ALT (SGPT) 19 2020     Lipid Panel:  Lab Results   Component Value Date    CHOL 168 2017    TRIG 115 10/25/2024    HDL 50 10/25/2024    VLDL 21 10/25/2024    LDL 79 10/25/2024     HbA1c:  Hemoglobin A1C   Date Value Ref Range Status   2025 10.9 (A) 4.5 - 5.7 % Final     Glucose:  Lab Results   Component Value Date    POCGLU 232 (A) 2025     Microalbumin:  Lab Results   Component Value Date    MALBCRERATIO 21 2021     TSH:  Lab Results   Component Value Date    TSH 20.400 (H) 10/25/2024       Assessment and Plan    Diagnoses and all orders for this visit:    1. Type 2 diabetes mellitus with hyperglycemia, with long-term current use of insulin (Primary)  Assessment & Plan:  Diabetes is not controlled.  A1C is 10.9% today   Not checking BG at home currently; cost concerns with CGM through insurance in past; Encouraged continued BG monitoring; refilled test strips.     Rx:   - Start Januvia 25 m tablet once daily.  Caution at risk for GI intolerance.  - Start repaglinide 1 m tablet before meals three times daily if blood sugar is more than 100.  Caution risk for hypoglycemia.  - Continue jardiance  25 m tablet once daily.   - Increase Toujeo  64 units at bedtime.  Increase by 2 units every 3 days if fasting blood sugar is more than 140.   Keep at current dose if fasting blood sugar is between .  Decrease by 2 units if fasting blood sugar is less than 80 or concerns for hypoglycemia overnight or between meals.    Cautioned risk for hypoglycemia; advised glucose supplement as needed.    Foot exam performed today  Microalbumin ordered  Eye exam declines referral; encouraged yearly follow-up   LDL at goal <100  BP at goal <130/80    Discussed complications associated with diabetes.   Discussed BG goals  fasting, <180 2-hours postprandial.   Encouraged adherence with taking medications;    Encouraged continued effort toward lifestyle management:         Increase lean protein and vegetable intake  Avoid concentrated sugar drinks, such as sodas, and processed carbs including crackers, cookies, cakes  Routine physical activity.     Orders:  -     POC Glycosylated Hemoglobin (Hb A1C)  -     POC Glucose, Blood  -     glucagon, human recombinant, (GLUCAGEN DIAGNOSTIC) 1 MG injection; Infuse 1 mg into a venous catheter 1 (One) Time for 1 dose.  Dispense: 1 each; Refill: 0  -     Ambulatory Referral to Nutrition Services  -     SITagliptin (Januvia) 25 MG tablet; Take 1 tablet by mouth Daily.  Dispense: 90 tablet; Refill: 1  -     repaglinide (PRANDIN) 1 MG tablet; Take 1 tablet by mouth 3 (Three) Times a Day Before Meals.  Dispense: 270 tablet; Refill: 1  -     empagliflozin (Jardiance) 25 MG tablet tablet; Take 1 tablet by mouth Daily. For diabetes.  Dispense: 90 tablet; Refill: 3  -     Insulin Glargine, 1 Unit Dial, (Toujeo SoloStar) 300 UNIT/ML solution pen-injector injection; Inject 64 Units under the skin into the appropriate area as directed Every Night. Titrate 2 units every 3 days if fasting blood sugar is more than 140. Max 80 units.  Dispense: 24 mL; Refill: 0  -     glucose blood test strip; 3  times daily  Dispense: 300 each; Refill: 3  -     Lancets misc; Use 1 each 3 times a day.  Dispense: 200 each; Refill: 3  -     Microalbumin / Creatinine Urine Ratio - Urine, Clean Catch; Future  -     Comprehensive Metabolic Panel; Future    2. Acquired hypothyroidism  Assessment & Plan:  Not controlled due to history of noncompliance with medication.  Discussed additionally taking heartburn medication at time of thyroid medication can reduce absorption and avoid taking for at least 4 hours.  Advised additionally avoid any multivitamins containing iron/calcium at time of thyroid medicine if taking in future for at least 4 hours.    Rx: Restart levothyroxine 300 mcg.  Discussed taking 2 tablets the following day if missed dose of medication.  Pending orders placed for repeat labs in 6 weeks for continued monitoring and adjustment.    Discussed risk for complications with uncontrolled hypothyroidism.    Orders:  -     levothyroxine (Synthroid) 300 MCG tablet; Take 1 tablet by mouth Every Morning.  Dispense: 90 tablet; Refill: 1  -     TSH; Future  -     T4, Free; Future         Return in about 3 months (around 8/13/2025) for follow-up diabetes. The patient was instructed to contact the clinic with any interval questions or concerns.    Electronically signed by: Sandy Wasserman PA-C   Endocrinology    Please note that portions of this note were completed with a voice recognition program.

## 2025-05-13 NOTE — PATIENT INSTRUCTIONS
Diabetes Treatment Recommendations  Patient     Rox Garner     Date:        25     ADA General Goals: A1c: < 7%                                                                                   Your A1C is   Lab Results   Component Value Date    HGBA1C 10.9 (A) 2025    HGBA1C 11.40 (H) 10/25/2024    HGBA1C 12.50 (H) 2023     Fasting/before meal glucose: <150 mg/dL                                    2 Hour after meal glucoses: < 180 mg/dL                                        Bedtime glucose:120-180                                                                Glucose testing frequency: 2-3 times daily     Medication Changes:  - Start Januvia 25 m tablet once daily.  May cause increased heartburn reflux, constipation.    - Start repaglinide 1 m tablet before meals three times daily if blood sugar is more than 100.     - Continue jardiance 25 m tablet once daily.     Insulin dosing:  Basal insulin (Long acting) Toujeo  64 units at bedtime.  Increase by 2 units every 3 days if fasting blood sugar is more than 140.   Keep at current dose if fasting blood sugar is between .  Decrease by 2 units if fasting blood sugar is less than 80 or concerns for hypoglycemia overnight or between meals.    Keep records of your glucose levels and insulin adjustments.   We may ask you to keep records on the content of your meals with insulin doses and before/after meal glucose levels to evaluate your ratios.  Call for advice if you have unexplained or unexpected hypoglycemia  (glucose < 70) or persistent high glucose > 250.  Office: [phone]    Sandy Wasserman PA-C

## 2025-05-13 NOTE — ASSESSMENT & PLAN NOTE
Diabetes is not controlled.  A1C is 10.9% today   Not checking BG at home currently; cost concerns with CGM through insurance in past; Encouraged continued BG monitoring; refilled test strips.     Rx:   - Start Januvia 25 m tablet once daily.  Caution at risk for GI intolerance.  - Start repaglinide 1 m tablet before meals three times daily if blood sugar is more than 100.  Caution risk for hypoglycemia.  - Continue jardiance 25 m tablet once daily.   - Increase Toujeo  64 units at bedtime.  Increase by 2 units every 3 days if fasting blood sugar is more than 140.   Keep at current dose if fasting blood sugar is between .  Decrease by 2 units if fasting blood sugar is less than 80 or concerns for hypoglycemia overnight or between meals.    Cautioned risk for hypoglycemia; advised glucose supplement as needed.    Foot exam performed today  Microalbumin ordered  Eye exam declines referral; encouraged yearly follow-up   LDL at goal <100  BP at goal <130/80    Discussed complications associated with diabetes.   Discussed BG goals  fasting, <180 2-hours postprandial.   Encouraged adherence with taking medications;    Encouraged continued effort toward lifestyle management:         Increase lean protein and vegetable intake  Avoid concentrated sugar drinks, such as sodas, and processed carbs including crackers, cookies, cakes  Routine physical activity.

## 2025-05-13 NOTE — ASSESSMENT & PLAN NOTE
Not controlled due to history of noncompliance with medication.  Discussed additionally taking heartburn medication at time of thyroid medication can reduce absorption and avoid taking for at least 4 hours.  Advised additionally avoid any multivitamins containing iron/calcium at time of thyroid medicine if taking in future for at least 4 hours.    Rx: Restart levothyroxine 300 mcg.  Discussed taking 2 tablets the following day if missed dose of medication.  Pending orders placed for repeat labs in 6 weeks for continued monitoring and adjustment.    Discussed risk for complications with uncontrolled hypothyroidism.

## 2025-05-14 ENCOUNTER — PRIOR AUTHORIZATION (OUTPATIENT)
Dept: ENDOCRINOLOGY | Facility: CLINIC | Age: 50
End: 2025-05-14
Payer: COMMERCIAL

## 2025-05-14 NOTE — TELEPHONE ENCOUNTER
Rox Garner (Key: [id number])  PA Case ID #: [id number]  Rx #: [id number]  Need Help? Call us at [phone]  Outcome  Approved today by [employer] 2017  PA Case: [id number], Status: Approved, Coverage Starts on: 5/14/2025 12:00:00 AM, Coverage Ends on: 5/14/2026 12:00:00 AM.  Effective Date: 5/14/2025  Authorization Expiration Date: 5/14/2026  Drug  Januvia 25MG tablets  ePA cloud logo  Form  [employer] Form (2017 [employer])  Original Claim Info  76,24

## 2025-05-14 NOTE — TELEPHONE ENCOUNTER
Rox Garner (Key: [id number])  PA Case ID #: [id number]  Rx #: [id number]  Need Help? Call us at [phone]  Outcome  Approved today by [employer] 2017  PA Case: [id number], Status: Approved, Coverage Starts on: 5/14/2025 12:00:00 AM, Coverage Ends on: 5/14/2026 12:00:00 AM.  Effective Date: 5/14/2025  Authorization Expiration Date: 5/14/2026  Drug  Glucagon HCl (Diagnostic) 1MG solution  ePA cloud logo  Form  [employer] Electronic PA Form (2017 NCP[employer])  Original Claim Info  70,MR NON-FORMULARY DRUG, CONTACT PRESCRIBER